# Patient Record
Sex: FEMALE | Race: WHITE | NOT HISPANIC OR LATINO | Employment: OTHER | URBAN - METROPOLITAN AREA
[De-identification: names, ages, dates, MRNs, and addresses within clinical notes are randomized per-mention and may not be internally consistent; named-entity substitution may affect disease eponyms.]

---

## 2017-08-08 ENCOUNTER — HOSPITAL ENCOUNTER (INPATIENT)
Facility: CLINIC | Age: 62
LOS: 2 days | Discharge: HOME | DRG: 810 | End: 2017-08-12
Attending: EMERGENCY MEDICINE | Admitting: INTERNAL MEDICINE
Payer: COMMERCIAL

## 2017-08-08 ENCOUNTER — APPOINTMENT (OUTPATIENT)
Dept: GENERAL RADIOLOGY | Facility: CLINIC | Age: 62
DRG: 810 | End: 2017-08-08
Attending: EMERGENCY MEDICINE
Payer: COMMERCIAL

## 2017-08-08 DIAGNOSIS — R50.9 FEVER, UNSPECIFIED FEVER CAUSE: ICD-10-CM

## 2017-08-08 DIAGNOSIS — R50.9 FEVER: ICD-10-CM

## 2017-08-08 LAB
ALBUMIN SERPL-MCNC: 4 G/DL (ref 3.4–5)
ALBUMIN UR-MCNC: NEGATIVE MG/DL
ALP SERPL-CCNC: 63 U/L (ref 40–150)
ALT SERPL W P-5'-P-CCNC: 28 U/L (ref 0–50)
ANION GAP SERPL CALCULATED.3IONS-SCNC: 8 MMOL/L (ref 3–14)
APPEARANCE UR: CLEAR
AST SERPL W P-5'-P-CCNC: 21 U/L (ref 0–45)
BASOPHILS # BLD AUTO: 0 10E9/L (ref 0–0.2)
BASOPHILS NFR BLD AUTO: 0 %
BILIRUB DIRECT SERPL-MCNC: <0.1 MG/DL (ref 0–0.2)
BILIRUB SERPL-MCNC: 0.2 MG/DL (ref 0.2–1.3)
BILIRUB UR QL STRIP: NEGATIVE
BUN SERPL-MCNC: 11 MG/DL (ref 7–30)
CALCIUM SERPL-MCNC: 8.5 MG/DL (ref 8.5–10.1)
CHLORIDE SERPL-SCNC: 101 MMOL/L (ref 94–109)
CO2 SERPL-SCNC: 28 MMOL/L (ref 20–32)
COLOR UR AUTO: ABNORMAL
CREAT SERPL-MCNC: 0.75 MG/DL (ref 0.52–1.04)
DIFFERENTIAL METHOD BLD: ABNORMAL
EOSINOPHIL # BLD AUTO: 0 10E9/L (ref 0–0.7)
EOSINOPHIL NFR BLD AUTO: 0.6 %
ERYTHROCYTE [DISTWIDTH] IN BLOOD BY AUTOMATED COUNT: 12.6 % (ref 10–15)
GFR SERPL CREATININE-BSD FRML MDRD: 78 ML/MIN/1.7M2
GLUCOSE SERPL-MCNC: 140 MG/DL (ref 70–99)
GLUCOSE UR STRIP-MCNC: NEGATIVE MG/DL
HCT VFR BLD AUTO: 36.5 % (ref 35–47)
HGB BLD-MCNC: 12.4 G/DL (ref 11.7–15.7)
HGB UR QL STRIP: NEGATIVE
IMM GRANULOCYTES # BLD: 0 10E9/L (ref 0–0.4)
IMM GRANULOCYTES NFR BLD: 0 %
KETONES UR STRIP-MCNC: 5 MG/DL
LACTATE BLD-SCNC: 0.8 MMOL/L (ref 0.7–2.1)
LEUKOCYTE ESTERASE UR QL STRIP: NEGATIVE
LYMPHOCYTES # BLD AUTO: 0.5 10E9/L (ref 0.8–5.3)
LYMPHOCYTES NFR BLD AUTO: 32.5 %
MCH RBC QN AUTO: 30.8 PG (ref 26.5–33)
MCHC RBC AUTO-ENTMCNC: 34 G/DL (ref 31.5–36.5)
MCV RBC AUTO: 91 FL (ref 78–100)
MONOCYTES # BLD AUTO: 0.2 10E9/L (ref 0–1.3)
MONOCYTES NFR BLD AUTO: 13.9 %
MUCOUS THREADS #/AREA URNS LPF: PRESENT /LPF
NEUTROPHILS # BLD AUTO: 0.9 10E9/L (ref 1.6–8.3)
NEUTROPHILS NFR BLD AUTO: 53 %
NITRATE UR QL: NEGATIVE
NRBC # BLD AUTO: 0 10*3/UL
NRBC BLD AUTO-RTO: 0 /100
PH UR STRIP: 6.5 PH (ref 5–7)
PLATELET # BLD AUTO: 127 10E9/L (ref 150–450)
POTASSIUM SERPL-SCNC: 3.6 MMOL/L (ref 3.4–5.3)
PROCALCITONIN SERPL-MCNC: NORMAL NG/ML
PROT SERPL-MCNC: 7.1 G/DL (ref 6.8–8.8)
RBC # BLD AUTO: 4.02 10E12/L (ref 3.8–5.2)
RBC #/AREA URNS AUTO: 0 /HPF (ref 0–2)
RETICS # AUTO: 33 10E9/L (ref 25–95)
RETICS/RBC NFR AUTO: 0.8 % (ref 0.5–2)
SODIUM SERPL-SCNC: 137 MMOL/L (ref 133–144)
SP GR UR STRIP: 1 (ref 1–1.03)
URN SPEC COLLECT METH UR: ABNORMAL
UROBILINOGEN UR STRIP-MCNC: NORMAL MG/DL (ref 0–2)
WBC # BLD AUTO: 1.7 10E9/L (ref 4–11)
WBC #/AREA URNS AUTO: <1 /HPF (ref 0–2)

## 2017-08-08 PROCEDURE — 83605 ASSAY OF LACTIC ACID: CPT | Performed by: EMERGENCY MEDICINE

## 2017-08-08 PROCEDURE — 84443 ASSAY THYROID STIM HORMONE: CPT | Performed by: EMERGENCY MEDICINE

## 2017-08-08 PROCEDURE — 80076 HEPATIC FUNCTION PANEL: CPT | Performed by: EMERGENCY MEDICINE

## 2017-08-08 PROCEDURE — 99285 EMERGENCY DEPT VISIT HI MDM: CPT | Mod: Z6 | Performed by: EMERGENCY MEDICINE

## 2017-08-08 PROCEDURE — 87389 HIV-1 AG W/HIV-1&-2 AB AG IA: CPT | Performed by: EMERGENCY MEDICINE

## 2017-08-08 PROCEDURE — 40000611 ZZHCL STATISTIC MORPHOLOGY W/INTERP HEMEPATH TC 85060: Performed by: EMERGENCY MEDICINE

## 2017-08-08 PROCEDURE — 85025 COMPLETE CBC W/AUTO DIFF WBC: CPT | Performed by: EMERGENCY MEDICINE

## 2017-08-08 PROCEDURE — 99220 ZZC INITIAL OBSERVATION CARE,LEVL III: CPT | Mod: AI | Performed by: INTERNAL MEDICINE

## 2017-08-08 PROCEDURE — 99285 EMERGENCY DEPT VISIT HI MDM: CPT | Mod: 25 | Performed by: EMERGENCY MEDICINE

## 2017-08-08 PROCEDURE — 71020 XR CHEST 2 VW: CPT

## 2017-08-08 PROCEDURE — 80048 BASIC METABOLIC PNL TOTAL CA: CPT | Performed by: EMERGENCY MEDICINE

## 2017-08-08 PROCEDURE — 85045 AUTOMATED RETICULOCYTE COUNT: CPT | Performed by: EMERGENCY MEDICINE

## 2017-08-08 PROCEDURE — G0378 HOSPITAL OBSERVATION PER HR: HCPCS

## 2017-08-08 PROCEDURE — 84145 PROCALCITONIN (PCT): CPT | Performed by: EMERGENCY MEDICINE

## 2017-08-08 PROCEDURE — 81001 URINALYSIS AUTO W/SCOPE: CPT | Performed by: EMERGENCY MEDICINE

## 2017-08-08 PROCEDURE — 87040 BLOOD CULTURE FOR BACTERIA: CPT | Performed by: EMERGENCY MEDICINE

## 2017-08-08 PROCEDURE — 25000132 ZZH RX MED GY IP 250 OP 250 PS 637: Performed by: STUDENT IN AN ORGANIZED HEALTH CARE EDUCATION/TRAINING PROGRAM

## 2017-08-08 RX ORDER — VALACYCLOVIR 500 MG/1
250 TABLET ORAL DAILY
Status: DISCONTINUED | OUTPATIENT
Start: 2017-08-09 | End: 2017-08-12 | Stop reason: HOSPADM

## 2017-08-08 RX ORDER — NALOXONE HYDROCHLORIDE 0.4 MG/ML
.1-.4 INJECTION, SOLUTION INTRAMUSCULAR; INTRAVENOUS; SUBCUTANEOUS
Status: DISCONTINUED | OUTPATIENT
Start: 2017-08-08 | End: 2017-08-12 | Stop reason: HOSPADM

## 2017-08-08 RX ORDER — ACETAMINOPHEN 325 MG/1
650 TABLET ORAL EVERY 4 HOURS PRN
Status: DISCONTINUED | OUTPATIENT
Start: 2017-08-08 | End: 2017-08-12 | Stop reason: HOSPADM

## 2017-08-08 RX ADMIN — ACETAMINOPHEN 650 MG: 325 TABLET, FILM COATED ORAL at 23:15

## 2017-08-08 ASSESSMENT — ENCOUNTER SYMPTOMS
DYSURIA: 0
HEADACHES: 1
BACK PAIN: 0
COUGH: 0
DIARRHEA: 0
CHILLS: 1
RHINORRHEA: 0
FEVER: 1
CONSTIPATION: 0

## 2017-08-08 NOTE — IP AVS SNAPSHOT
[address] [address]    Phone:  [phone]                                       After Visit Summary   8/8/2017    Kiera Macedo    MRN: [id number]           After Visit Summary Signature Page     I have received my discharge instructions, and my questions have been answered. I have discussed any challenges I see with this plan with the nurse or doctor.    ..........................................................................................................................................  Patient/Patient Representative Signature      ..........................................................................................................................................  Patient Representative Print Name and Relationship to Patient    ..................................................               ................................................  Date                                            Time    ..........................................................................................................................................  Reviewed by Signature/Title    ...................................................              ..............................................  Date                                                            Time

## 2017-08-08 NOTE — LETTER
Transition Communication Hand-off for Care Transitions to Next Level of Care Provider    Name: Kiera Macedo  MRN #: [id number]  Primary Care Provider: Annette Gabriel     Primary Clinic: [address] [address]     Reason for Hospitalization:  Fever [R50.9]  Admit Date/Time: 8/8/2017  7:59 PM  Discharge Date: 8/12/17  Payor Source: Payor: BLUE PLUS / Plan: BLUE PLUS MNCARE / Product Type: HMO /          Reason for Communication Hand-off Referral: Other Continuity of care    Discharge Plan: Discharge to home with assist as needed       Concern for non-adherence with plan of care:   No    Already enrolled in Tele-monitoring program and name of program:  NA  Follow-up specialty is recommended: Yes    Follow-up plan:  Future Appointments  Date Time Provider Department Center   [facility]   8/24/2017 12:00 PM Magali Sidhu MD [facility]       Danica Alva  RN Care Coordinator  [phone]    AVS/Discharge Summary is the source of truth; this is a helpful guide for improved communication of patient story

## 2017-08-08 NOTE — IP AVS SNAPSHOT
MRN:[id number]                      After Visit Summary   8/8/2017    Kiera Macedo    MRN: [id number]           Thank you!     Thank you for choosing [facility] for your care. Our goal is always to provide you with excellent care. Hearing back from our patients is one way we can continue to improve our services. Please take a few minutes to complete the written survey that you may receive in the mail after you visit with us. Thank you!        Patient Information     Date Of Birth          1955        Designated Caregiver       Most Recent Value    Caregiver    Will someone help with your care after discharge? yes    Name of designated caregiver Elie    Phone number of caregiver [phone]    Caregiver address [address]      About your hospital stay     You were admitted on:  August 8, 2017 You last received care in the:  [address] [facility]    You were discharged on:  August 12, 2017        Reason for your hospital stay       You were hospitalized for a fever of unknown cause and a very low white blood cell count (immune cells). You were seen by hematology who did a bone marrow biopsy. You also received a thyroid ultrasound and a pelvic ultrasound to work up some areas of concern seen on CT scan. It is recommended to follow-up with your Primary Care Physician regarding the results of those test. Also follow-up with Dr. Sidhu in Hematology, you have an appointment on 8/24.                  Who to Call     For medical emergencies, please call 911.  For non-urgent questions about your medical care, please call your primary care provider or clinic, [phone]          Attending Provider     Provider Specialty    Micky Mclean MD Emergency Medicine    Tyrone Johnson MD Internal Medicine    Carlitos Thorpe MD Internal Medicine       Primary Care Provider Office Phone # Fax #    Annette Gabriel [phone]      After Care Instructions     Activity       Your activity upon  discharge: activity as tolerated            Diet       Follow this diet upon discharge: Regular                  Follow-up Appointments     Follow Up and recommended labs and tests       Follow up with primary care provider, Annette Gabriel, within 7 days for hospital follow- up.  The following labs/tests are recommended: Can consider non-emergent MRI to work up lesion on ovary seen in CT and ultrasound.                  Your next 10 appointments already scheduled     Aug 24, 2017 11:30 AM CDT   [facility] Draw with  [facility] LAB DRAW   [facility]    [address] [place] [id number] [phone]            Aug 24, 2017 12:00 PM CDT   (Arrive by 11:45 AM)   Return Visit with Magali Sidhu MD   [facility] ([facility])    [address] [place] [id number] [phone]              Further instructions from your care team       We have scheduled an appt for you on Tuesday 8/22 at 11:15 am with your Primary Care Provider, KENDY Gabriel. Please bring your insurance card, ID, and these discharge papers with you to this appointment.  [place] [address] [facility], [address]  [place] [address]  Phone #: [phone]      Pending Results     Date and Time Order Name Status Description    8/11/2017 0836 Fungal Culture Bone Marrow Preliminary     8/11/2017 0836 Blood Culture AFB Preliminary     8/11/2017 0836 CHROMOSOME BONE MARROW With Professional Interpretation In process     8/11/2017 0836 Leukemia Lymphoma Evaluation (Flow Cytometry) In process     8/11/2017 0836 Bone marrow biopsy In process     8/11/2017 0001 Magdalena Barr Virus Qualitative PCR In process     8/10/2017 1658 Cytomegalovirus Qualitative PCR In process     8/9/2017 0914 Erythrocyte sedimentation rate auto In process     8/9/2017 0914 CRP inflammation In process      "2017 Blood culture Preliminary     2017 Blood culture Preliminary             Statement of Approval     Ordered          17 1131  I have reviewed and agree with all the recommendations and orders detailed in this document.  EFFECTIVE NOW     Approved and electronically signed by:  Wendy Samuels MD             Admission Information     Date & Time Provider Department Dept. Phone    2017 Carlitos Thorpe MD [address] [facility] [phone]      Your Vitals Were     Blood Pressure Pulse Temperature Respirations Weight Pulse Oximetry    99/67 (BP Location: Left arm) 70 96.5  F (35.8  C) (Axillary) 16 62 kg (136 lb 9.6 oz) 97%      MyChart Information     [employer] lets you send messages to your doctor, view your test results, renew your prescriptions, schedule appointments and more. To sign up, go to [email or web address] . Click on \"Log in\" on the left side of the screen, which will take you to the Welcome page. Then click on \"Sign up Now\" on the right side of the page.     You will be asked to enter the access code listed below, as well as some personal information. Please follow the directions to create your username and password.     Your access code is: [email or web address]  Expires: 2017 11:57 AM     Your access code will  in 90 days. If you need help or a new code, please call your [facility] clinic or [phone].        Care EveryWhere ID     This is your Care EveryWhere ID. This could be used by other organizations to access your [facility] medical records  [id number]        Equal Access to Services     [facility]: Hadii zack salomon hadioanao Soandria, waaxda luqadaha, qaybta kaalmada yoandy, shy edwards . So [facility] [phone].    ATENCIÓN: Si habla español, tiene a sidhu disposición servicios gratuitos de asistencia lingüística. Llame al [phone].    We comply with applicable federal civil rights laws and [place] laws. We do not discriminate on the " basis of race, color, national origin, age, disability sex, sexual orientation or gender identity.               Review of your medicines      CONTINUE these medicines which have NOT CHANGED        Dose / Directions    AMITRIPTYLINE HCL PO        Dose:  25 mg   Take 25 mg by mouth At Bedtime   Refills:  0       VALTREX PO        Dose:  250 mg   Take 250 mg by mouth daily   Refills:  0                Protect others around you: Learn how to safely use, store and throw away your medicines at www.disposemymeds.org.             Medication List: This is a list of all your medications and when to take them. Check marks below indicate your daily home schedule. Keep this list as a reference.      Medications           Morning Afternoon Evening Bedtime As Needed    AMITRIPTYLINE HCL PO   Take 25 mg by mouth At Bedtime   Last time this was given:  25 mg on 8/11/2017 10:11 PM                                VALTREX PO   Take 250 mg by mouth daily   Last time this was given:  250 mg on 8/12/2017  8:01 AM

## 2017-08-09 LAB
ANION GAP SERPL CALCULATED.3IONS-SCNC: 8 MMOL/L (ref 3–14)
BASOPHILS # BLD AUTO: 0 10E9/L (ref 0–0.2)
BASOPHILS NFR BLD AUTO: 0 %
BUN SERPL-MCNC: 13 MG/DL (ref 7–30)
CALCIUM SERPL-MCNC: 8.2 MG/DL (ref 8.5–10.1)
CHLORIDE SERPL-SCNC: 103 MMOL/L (ref 94–109)
CO2 SERPL-SCNC: 25 MMOL/L (ref 20–32)
COPATH REPORT: NORMAL
CREAT SERPL-MCNC: 0.78 MG/DL (ref 0.52–1.04)
CRP SERPL-MCNC: <2.9 MG/L (ref 0–8)
DIFFERENTIAL METHOD BLD: ABNORMAL
DIFFERENTIAL METHOD BLD: NORMAL
EOSINOPHIL # BLD AUTO: 0 10E9/L (ref 0–0.7)
EOSINOPHIL NFR BLD AUTO: 0 %
ERYTHROCYTE [DISTWIDTH] IN BLOOD BY AUTOMATED COUNT: 12.7 % (ref 10–15)
ERYTHROCYTE [DISTWIDTH] IN BLOOD BY AUTOMATED COUNT: NORMAL % (ref 10–15)
ERYTHROCYTE [SEDIMENTATION RATE] IN BLOOD BY WESTERGREN METHOD: 6 MM/H (ref 0–30)
ERYTHROCYTE [SEDIMENTATION RATE] IN BLOOD BY WESTERGREN METHOD: NORMAL MM/H (ref 0–30)
GFR SERPL CREATININE-BSD FRML MDRD: 75 ML/MIN/1.7M2
GLUCOSE SERPL-MCNC: 86 MG/DL (ref 70–99)
HCT VFR BLD AUTO: 38 % (ref 35–47)
HCT VFR BLD AUTO: NORMAL % (ref 35–47)
HGB BLD-MCNC: 12.6 G/DL (ref 11.7–15.7)
HGB BLD-MCNC: NORMAL G/DL (ref 11.7–15.7)
HIV 1+2 AB+HIV1 P24 AG SERPL QL IA: NORMAL
IMM GRANULOCYTES # BLD: 0 10E9/L (ref 0–0.4)
IMM GRANULOCYTES NFR BLD: 0 %
LYMPHOCYTES # BLD AUTO: 0.6 10E9/L (ref 0.8–5.3)
LYMPHOCYTES NFR BLD AUTO: 59 %
MCH RBC QN AUTO: 30.5 PG (ref 26.5–33)
MCH RBC QN AUTO: NORMAL PG (ref 26.5–33)
MCHC RBC AUTO-ENTMCNC: 33.2 G/DL (ref 31.5–36.5)
MCHC RBC AUTO-ENTMCNC: NORMAL G/DL (ref 31.5–36.5)
MCV RBC AUTO: 92 FL (ref 78–100)
MCV RBC AUTO: NORMAL FL (ref 78–100)
MONOCYTES # BLD AUTO: 0.1 10E9/L (ref 0–1.3)
MONOCYTES NFR BLD AUTO: 11.4 %
NEUTROPHILS # BLD AUTO: 0.3 10E9/L (ref 1.6–8.3)
NEUTROPHILS NFR BLD AUTO: 29.6 %
NRBC # BLD AUTO: 0 10*3/UL
NRBC BLD AUTO-RTO: 0 /100
PLATELET # BLD AUTO: 108 10E9/L (ref 150–450)
PLATELET # BLD AUTO: NORMAL 10E9/L (ref 150–450)
PLATELET # BLD EST: ABNORMAL 10*3/UL
POTASSIUM SERPL-SCNC: 4 MMOL/L (ref 3.4–5.3)
RBC # BLD AUTO: 4.13 10E12/L (ref 3.8–5.2)
RBC # BLD AUTO: NORMAL 10E12/L (ref 3.8–5.2)
SODIUM SERPL-SCNC: 136 MMOL/L (ref 133–144)
TSH SERPL DL<=0.005 MIU/L-ACNC: 0.8 MU/L (ref 0.4–4)
WBC # BLD AUTO: 1.1 10E9/L (ref 4–11)
WBC # BLD AUTO: NORMAL 10E9/L (ref 4–11)

## 2017-08-09 PROCEDURE — 36415 COLL VENOUS BLD VENIPUNCTURE: CPT | Performed by: INTERNAL MEDICINE

## 2017-08-09 PROCEDURE — 85652 RBC SED RATE AUTOMATED: CPT | Performed by: INTERNAL MEDICINE

## 2017-08-09 PROCEDURE — 86140 C-REACTIVE PROTEIN: CPT | Performed by: INTERNAL MEDICINE

## 2017-08-09 PROCEDURE — 99233 SBSQ HOSP IP/OBS HIGH 50: CPT | Mod: GC | Performed by: INTERNAL MEDICINE

## 2017-08-09 PROCEDURE — 80048 BASIC METABOLIC PNL TOTAL CA: CPT | Performed by: STUDENT IN AN ORGANIZED HEALTH CARE EDUCATION/TRAINING PROGRAM

## 2017-08-09 PROCEDURE — 25000132 ZZH RX MED GY IP 250 OP 250 PS 637: Performed by: STUDENT IN AN ORGANIZED HEALTH CARE EDUCATION/TRAINING PROGRAM

## 2017-08-09 PROCEDURE — G0378 HOSPITAL OBSERVATION PER HR: HCPCS

## 2017-08-09 PROCEDURE — 85025 COMPLETE CBC W/AUTO DIFF WBC: CPT | Performed by: INTERNAL MEDICINE

## 2017-08-09 PROCEDURE — 80048 BASIC METABOLIC PNL TOTAL CA: CPT | Performed by: INTERNAL MEDICINE

## 2017-08-09 RX ORDER — ROPINIROLE 0.25 MG/1
0.25 TABLET, FILM COATED ORAL DAILY
Status: DISCONTINUED | OUTPATIENT
Start: 2017-08-09 | End: 2017-08-12 | Stop reason: HOSPADM

## 2017-08-09 RX ADMIN — AMITRIPTYLINE HYDROCHLORIDE 25 MG: 25 TABLET, FILM COATED ORAL at 22:42

## 2017-08-09 RX ADMIN — ROPINIROLE HYDROCHLORIDE 0.25 MG: 0.25 TABLET, FILM COATED ORAL at 15:07

## 2017-08-09 RX ADMIN — ACETAMINOPHEN 650 MG: 325 TABLET, FILM COATED ORAL at 15:07

## 2017-08-09 RX ADMIN — ACETAMINOPHEN 650 MG: 325 TABLET, FILM COATED ORAL at 05:08

## 2017-08-09 RX ADMIN — Medication 250 MG: at 07:51

## 2017-08-09 RX ADMIN — AMITRIPTYLINE HYDROCHLORIDE 25 MG: 25 TABLET, FILM COATED ORAL at 05:07

## 2017-08-09 NOTE — PLAN OF CARE
Problem: Discharge Planning  Goal: Discharge Planning (Adult, OB, Behavioral, Peds)  Outpatient/Observation goals to be met before discharge home:     1.  Diagnostic tests and consults completed and resulted:  NO, AM labs scheduled   2.  Vital signs normal or at pt baseline:  YES  Nurse to notify provider when observation goals have been met and pt is ready for discharge.

## 2017-08-09 NOTE — PLAN OF CARE
Problem: Discharge Planning  Goal: Discharge Planning (Adult, OB, Behavioral, Peds)  Outpatient/Observation goals to be met before discharge home:     1.  Diagnostic tests and consults completed and resulted:  NO, AM and evening labs scheduled. Absolute neutrophil result 0.3, down from 0.9.   2.  Vital signs normal or at pt baseline:  YES  Nurse to notify provider when observation goals have been met and pt is ready for discharge.

## 2017-08-09 NOTE — ED NOTES
Pt comes in from urgent care for f/u on low white count and 2 days of fevers and generalized aches. Pt says this happens intermittently, but symptoms usually resolve. She has never had a low wbc in the past with symptoms. Hx tick borne illness last year, has been tested multiple times this summer for recurrence of tick borne illness but has never been positive. Alert and oriented. Temp 99.6 oral, given 450mg tylenol about 1.5 hours ago at urgent care. Pt says at urgent care her temp was over 102.

## 2017-08-09 NOTE — PROGRESS NOTES
[facility] Internal Medicine Progress Note          Assessment and Plan:   Assessment:   Kiera Macedo is a 62 yo F with PMH of 1 level spinal fusion who presented to [facility] ED from [place] on 8/8 for intermittent fevers with chills from May until Mid-June and was found to be neutropenic and leukopenic (, ) upon visit to urgent care.       Plan:   #Fever of unknown origin  #Leukopenia  #Absolute Neutropenia  #Thrombocytopenia  Tick-borne illness vs bone marrow infiltrative vs viral illness. Viral illness is most likely, but has no sick contacts or other localizing symptoms. Possibly tick-borne due to pt's history of multiple exposures and outdoor behavior- [place], states her dog got lyme disease from a tick in the park. Has not had any known tick exposures or rashes. 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 Unlikely bone marrow due to normal Hgb without night sweats or bone pain. Admitted with , , WBC 1.7K, . (8/9) labs: WBC 1.1K, , , . HIV negative, TSH and LFTs normal. Lactic acid normal. ESR normal.   - Recheck CBC this evening and tomorrow AM   - Neutropenic precautions    - If febrile overnight, consider ID consult in AM   - Can also consider Heme/Onc consult for severe neutropenia   - HIV pending   - Blood smear pending   - Blood cultures pending   - Tickborne serologies pending for outside urgent care - Babesia and Anaplasma      - Negative for Lyme serologies.     #Joint Pains  Likely related to causation of fevers due to timing following with febrile episodes. Denies muscle pains or current joint pains. Negative for edema or rashes   - Tylenol PRN        Diet: Regular Diet Adult  Fluids: PO, no IVF indicated  DVT Prophylaxis: Ambulate every shift  Code Status: Full Code    Patient discussed with Dr. Thorpe, who agrees with the assessment and plan.     Wendy Samuels  MD  Internal Medicine, PGY 1  [phone]        Interval History:   Patient sleeping comfortably. Denying pain. States this feels very similar to when she had anaplasmosis in the past, in 2016 - she was treated with doxycycline. Had been afebrile since mid June following 2-3 weeks of intermittent fevers. Denies any sick contacts or any other localizing symptoms - no cough, SOB, headache, chest pain, rashes, trauma, ab pain, urinary symptoms, neck stiffness.            Significant Problems:     Past Medical History:   Diagnosis Date     Herpes              Review of Systems:   A comprehensive review of systems was performed and found to be negative except as described in this note           Medications:     Prescriptions Prior to Admission   Medication Sig Dispense Refill Last Dose     AMITRIPTYLINE HCL PO Take 25 mg by mouth At Bedtime   8/7/2017 at Unknown time     ValACYclovir HCl (VALTREX PO) Take 250 mg by mouth daily   8/8/2017 at Unknown time        Physical Exam:  /71 (BP Location: Right arm)  Pulse 77  Temp 99.3  F (37.4  C) (Oral)  Resp 16  SpO2 95%  GEN: NAD, sleeping, non-toxic  HEENT: EOMI, no scleral icterus  CV: RRR, no M/R/G appreciated, normal S1/S2  PULM: CTAB, good effort, no increased work of breathing  ABD: Normal BS, non-tender, non-distended  EXT: No edema, ulcerations   NEURO: EOMI, appropriate mentation, no aphasia          Data:     Lab Results   Component Value Date    WBC 1.1 (L) 08/09/2017    WBC  08/09/2017     Unsatisfactory specimen - clotted  Notified Lynda Tvedt 6D, 1030 8/9/17 by       WBC 1.7 (L) 08/08/2017    HGB 12.6 08/09/2017    HGB  08/09/2017     Unsatisfactory specimen - clotted  Notified Lynda Tvedt 6D, 1030 8/9/17 by       HGB 12.4 08/08/2017    HCT 38.0 08/09/2017    HCT  08/09/2017     Unsatisfactory specimen - clotted  Notified Lynda Tvedt 6D, 1030 8/9/17 by       HCT 36.5 08/08/2017     (L) 08/09/2017    PLT  08/09/2017     Unsatisfactory  specimen - clotted  Notified Lynda Tvedt 6D, 1030 8/9/17 by        (L) 08/08/2017     08/09/2017     08/08/2017    POTASSIUM 4.0 08/09/2017    POTASSIUM 3.6 08/08/2017    CHLORIDE 103 08/09/2017    CHLORIDE 101 08/08/2017    CO2 25 08/09/2017    CO2 28 08/08/2017    BUN 13 08/09/2017    BUN 11 08/08/2017    CR 0.78 08/09/2017    CR 0.75 08/08/2017    GLC 86 08/09/2017     (H) 08/08/2017    SED 6 08/09/2017    SED  08/09/2017     Unsatisfactory specimen - clotted  Notified Lynda Tvedt 6D, 1030 8/9/17 by       AST 21 08/08/2017    ALT 28 08/08/2017    ALKPHOS 63 08/08/2017    BILITOTAL 0.2 08/08/2017

## 2017-08-09 NOTE — ED PROVIDER NOTES
History     Chief Complaint   Patient presents with     Fever     Abnormal Labs     HPI  Kiera Macedo is a 61 year old female with a history of neck fusion surgery (1 pin in neck) who presents to the ED with fever and abnormal labs. Patient states she has had intermittent fevers and generalized body aches for the past couple of months. She states she been going in to  several times for this with no diagnosis. She was seen in an  clinic today and had a low WBC.  She reports she started feeling unwell 1-2 days ago and has complaints of fever, generalized body aches, a bit of a left-sided headache, and chills. She states she had her last physical 6 months ago and recently switched from [facility] to [facility] in the last couple of months. She states her current symptoms are similar to when she had a tick bourne disease last summer and was treated with doxycyline which improved her symptoms. She also reports she had low platelets at that time.  She otherwise denies cough, rhinorrhea, constipation, diarrhea, dysuria, rash, recent travel,or back pain.     PAST MEDICAL HISTORY  Past Medical History:   Diagnosis Date     Herpes      PAST SURGICAL HISTORY  Past Surgical History:   Procedure Laterality Date      SECTION       FUSION CERVICAL ANTERIOR ONE LEVEL       SINUS SURGERY       FAMILY HISTORY  Family History   Problem Relation Age of Onset     HEART DISEASE Father      SOCIAL HISTORY  Social History   Substance Use Topics     Smoking status: Never Smoker     Smokeless tobacco: Never Used     Alcohol use Yes      Comment: 3-4 glasses per week     MEDICATIONS  Current Facility-Administered Medications   Medication     amitriptyline (ELAVIL) tablet 25 mg     valACYclovir (VALTREX) half-tab 250 mg     naloxone (NARCAN) injection 0.1-0.4 mg     acetaminophen (TYLENOL) tablet 650 mg     ALLERGIES  Allergies   Allergen Reactions     Ampicillin Diarrhea       I have reviewed the Medications, Allergies, Past  Medical and Surgical History, and Social History in the Epic system.    Review of Systems   Constitutional: Positive for chills and fever.   HENT: Negative for rhinorrhea.    Respiratory: Negative for cough.    Gastrointestinal: Negative for constipation and diarrhea.   Genitourinary: Negative for dysuria. Difficulty urinating: left sided.   Musculoskeletal: Negative for back pain.   Skin: Negative for rash.   Neurological: Positive for headaches.   All other systems reviewed and are negative.      Physical Exam   BP: 110/74  Heart Rate: 88  Temp: 99.6  F (37.6  C)  Resp: 16  SpO2: 98 %  Physical Exam   Constitutional: She is oriented to person, place, and time. She appears well-developed and well-nourished. No distress.   HENT:   Head: Normocephalic and atraumatic.   Mouth/Throat: Oropharynx is clear and moist. No oropharyngeal exudate.   Eyes: Pupils are equal, round, and reactive to light. No scleral icterus.   Neck: Normal range of motion. Neck supple.   Cardiovascular: Normal rate, normal heart sounds and intact distal pulses.    Pulmonary/Chest: Effort normal and breath sounds normal. No respiratory distress.   Abdominal: Soft. Bowel sounds are normal. There is no tenderness.   Musculoskeletal: She exhibits no edema or tenderness.   Neurological: She is alert and oriented to person, place, and time.   Skin: Skin is warm and dry. No rash noted. She is not diaphoretic. No erythema. No pallor.       ED Course     ED Course     Procedures   8:26 PM  The patient was seen and examined by Dr. Mclean in Room 14.                Critical Care time:  none               Labs Ordered and Resulted from Time of ED Arrival Up to the Time of Departure from the ED   BASIC METABOLIC PANEL - Abnormal; Notable for the following:        Result Value    Glucose 140 (*)     All other components within normal limits   CBC WITH PLATELETS DIFFERENTIAL - Abnormal; Notable for the following:     WBC 1.7 (*)     Platelet Count 127 (*)      Absolute Neutrophil 0.9 (*)     Absolute Lymphocytes 0.5 (*)     All other components within normal limits   ROUTINE UA WITH MICROSCOPIC REFLEX TO CULTURE - Abnormal; Notable for the following:     Ketones Urine 5 (*)     Mucous Urine Present (*)     All other components within normal limits   LACTIC ACID WHOLE BLOOD   BLOOD MORPHOLOGY PATHOLOGIST REVIEW   RETICULOCYTE COUNT   HEPATIC PANEL            Assessments & Plan (with Medical Decision Making)   This is a 61-year-old female patient who is otherwise healthy with no significant past medical history is presenting to the emergency room with intermittent fevers.  Her physical exam is otherwise unremarkable.  She does not have a fever here as she took Tylenol just prior to coming into the emergency room.  Her blood pressure is otherwise unremarkable.  She was at an urgent care prior to coming into the emergency room and found to have a fever 102 Fahrenheit. Their greatest concern is the fact that she had an absolute Neutropenia.  Her White Count Is Only 1.8.  Is Unclear As to the Etiology of Her Neutropenia.  She Has Otherwise Been a Healthy Person.  At This Time She Will Be Admitted to the Medicine Service for Continued Care Management.  Were Unable to Find the Source of Any Infection at This Time.  We Did Discuss Any Acute Interventions Required Here in the Emergency Room and Have Determined That We Will Hold off on Antibiotics at This Time.    I have reviewed the nursing notes.    I have reviewed the findings, diagnosis, plan and need for follow up with the patient.    Current Discharge Medication List          Final diagnoses:   Fever     David SALAZAR, am serving as a trained medical scribe to document services personally performed by Micky Mclean MD, based on the provider's statements to me.      Micky SALAZAR MD, was physically present and have reviewed and verified the accuracy of this note documented by David Venegas.     8/8/2017   [facility],  EMERGENCY DEPARTMENT     Micky Mclean MD  08/09/17 0121

## 2017-08-09 NOTE — PROGRESS NOTES
Pt admitted to unit 6D from ED.  Temp 99.1.  Reports intermittent fevers and generalized weakness off and on for past several months.  Admission profile complete.  Oriented to call light and unit procedures.  Plan is for blood morphology to be sent in AM and await results.  Pt has PIV SL.  Up independently.  Tylenol given for headache pain.  Cont with POC.

## 2017-08-09 NOTE — H&P
[facility], [place]    Internal Medicine History and Physical - [facility] Service       Date of Admission:  8/8/2017    Chief Complaint   fever    History is obtained from the patient    History of Present Illness   Kiera Macedo is a 61 year old female with past medical history of 1 level spinal fusion who presented to the ED from  on 8/8 admitted for intermittent fevers and leukopenia. Notes the fevers started in May when she had a fever for about 1.5 days with joint aches which both resolved and then recurred about every 1-2 weeks until mid-June when they stopped. The fevers coincide with aching joints mostly in her hips, shoulders and ankles. Denies any other associated symptoms: weight loss, loss of appetite, mouth sores, shortness of breath, cough, sore throat, chest pain, nausea, vomiting, diarrhea, constipation, confusion, fatigue, vision changes, edema, numbness, tingling, lightheadedness, headache or urinary symptoms. She presented to urgent care a few times during the 6 weeks with labs (CBC, stool cultures, blood cultures, UA, SAMIR, sed rate, ESR, CCP ab, babesia, CMP, lyme, anaplasma) all returning unremarkable and CXR negative for acute process.    Between June and now patient notes she felt completely well. The fever started again today with chills this afternoon - temp was 99 at home and she checked it every few hours and continued to increase by 0.2 degrees so she went to urgent care. Today she endorses mild headache, sinus congestion and some bruising on her forearms. At urgent care today, Will repeat tick-borne illness panel at this point, discussed sometimes early in course the tests can be falsely negative so repeating them is indicated. WBC returned with new neutropenia of 1000 and lymphopenia of 600 and in the setting of fever to 102 recommended patient go to ER.    She states her current symptoms are similar to when she had a tick bourne disease (anaplasma likely -  lyme and babesia negative at the time) last summer and was treated with doxycyline which improved her symptoms. She also reports she had low platelets at that time and did receive a transfusion. Travels monthly. Within the last year to [place], [place], [place] ([place]) and [place]. No issues with illness during or after the trips. She has two dogs at home, avid  and currently works three jobs:  B&B owner, realtor and online . Only takes medications listed below and multivitamin, calcium, coQ10 and tart cherry juice.    In the ER today, HR, RR and BP were all within normal limits with a temperature of 99.6F. Labs ordered included blood culture, UA, CMP, CBC, lactate, reticulocyte count, and procal. CXR without any acute findings. UA, procal, lactic acid, CMP were normal. CBC with , , platelets 127 which all were completely normal 17.     Review of Systems   The 10 point Review of Systems is negative other than noted in the HPI or here.     Past Medical History    I have reviewed this patient's medical history and updated it with pertinent information if needed.   Past Medical History:   Diagnosis Date     Herpes         Past Surgical History   I have reviewed this patient's surgical history and updated it with pertinent information if needed.  Past Surgical History:   Procedure Laterality Date      SECTION       FUSION CERVICAL ANTERIOR ONE LEVEL       SINUS SURGERY          Social History   Social History   Substance Use Topics     Smoking status: Never Smoker     Smokeless tobacco: Never Used     Alcohol use Yes      Comment: 3-4 glasses per week   Currently works three jobs: B&B owner, realtor and online   Has a boyfriend and 2 dogs.  Travels monthly. Within the last year to [place], [place], [place] ([place] and [place].    Family History   I have reviewed this patient's family history and updated it with pertinent information if  needed.   Family History   Problem Relation Age of Onset     HEART DISEASE Father    No family history of rheumatoid arthritis, has a niece with lupus.  No significant cancer history.    Prior to Admission Medications   Prior to Admission Medications   Prescriptions Last Dose Informant Patient Reported? Taking?   AMITRIPTYLINE HCL PO 8/7/2017 at Unknown time  Yes Yes   Sig: Take 25 mg by mouth At Bedtime   ValACYclovir HCl (VALTREX PO) 8/8/2017 at Unknown time  Yes Yes   Sig: Take 250 mg by mouth daily      Facility-Administered Medications: None     Allergies   Allergies   Allergen Reactions     Ampicillin Diarrhea       Physical Exam   Vital Signs: Temp: 99.1  F (37.3  C) Temp src: Oral BP: 126/82 Pulse: 77 Heart Rate: 88 Resp: 16 SpO2: 96 % O2 Device: None (Room air)    Weight: 0 lbs 0 oz    General: In no acute distress, resting comfortably in bed, excellent historian  HEENT: PERRL, EOMI, throat clear, mmm, no oral ulcers  Lymph: no cervical adenopathy   Cardiac: regular rate and rhythm, normal s1s2, no murmur or rub, capillary refill < 2 sec, peripheral pulses intact  Pulmonary: CTAB easy respiratory effort on room air  Abdomen: soft, non-tender, non-distended, bowel sounds present  Musculoskeletal: no peripheral or joint edema or erythema, no CVA tenderness   Skin: no erythema, rash, jaundice, or pallor  Neuro: alert and oriented x3, no focal deficit, CN 2-12 grossly intact    Labs and imaging reviewed in [facility].    Assessment & Plan   Kiera Macedo is a 61 year old female with past medical history of 1 level spinal fusion who presented to the ED from  on 8/8 admitted for intermittent fevers with chills every few weeks from May until Mid- June now returning and new leukopenia ( and ) found on visit to urgent care.     # Fever  Meets criteria for fever of unknown origin - fevers greater than 100.9, present for greater than 3 weeks and no diagnosis so far. No clear daily cyclical pattern or  associated symptoms other than joint aches. Broad differential includes infection vs malignancy vs connective tissue disease vs others. Recently this is more concerning in the setting of new neutropenia and lymphopenia for HIV vs malignancy vs unknown infection.   - monitor vitals and signs and symptoms guide further testing  - in addition to follow in up labs from ER ordered HIV, peripheral smear, and TSH  - follow up tick jimmy illness testing completed in urgent care    # Neutropenia  On admission, absolute neurophil count of 900. Does not meet criteria for neutropenia fever. Some concern for infiltrative process of the bone marrow however patient is not pancytopenic. Likely not drug induced as patient is only on acyclovir and amitriptyline which are long standing medications. She does not have history of autoimmune disease. Could be low in the setting of unknown infection.  - labs as above  - monitor CBC  - consider hematology consult if does not improve    # Lymphopenia  On admission, absolute lymphocyte count of 500. Some concern for infiltrative process of the bone marrow however patient is not pancytopenic. Recent HIV status unknown. Does not appear malnourished.  - HIV and labs as above  - monitor CBC  - consider hematology consult if does not improve    # Thrombocytopenia  Most recently was 148 on 8/8 at urgent care and decreased slightly to 127 on admission. Some concern for infiltrative process of the bone marrow however patient is not pancytopenic.   - recheck tomorrow morning  - consider hematology consult if does not improve    # Joint Aches  Coincide with fevers. Joints without decreased ROM, edema or erythema.  - tylenol prn    Diet: Regular Diet Adult  Fluids: PO  DVT Prophylaxis: Ambulate every shift  Code Status: Full Code    Disposition Plan   Expected discharge: Tomorrow; recommended to prior living arrangement once further evaluation of fever is complete.     Entered: Augustina Carlson  08/09/2017, 1:31 AM   Information in the above section will display in the discharge planner report.    The patient was discussed with Dr. Tyrone Carlson   Med+Peds PGY1  Med[facility]   Pager: [phone]  Please see sticky note for cross cover information      Data   Data     Recent Labs  Lab 08/08/17 2024   WBC 1.7*   HGB 12.4   MCV 91   *      POTASSIUM 3.6   CHLORIDE 101   CO2 28   BUN 11   CR 0.75   ANIONGAP 8   BC 8.5   *   ALBUMIN 4.0   PROTTOTAL 7.1   BILITOTAL 0.2   ALKPHOS 63   ALT 28   AST 21     Recent Results (from the past 24 hour(s))   Chest XR,  PA & LAT    Narrative    XR CHEST 2 VW, 8/8/2017 8:43 PM.    Comparison: None.    History: fever.    Findings:   Cardiomediastinal silhouette and pulmonary vasculature within normal  limits. Minimal left basilar atelectasis, no acute airspace opacity.  No pleural effusion or pneumothorax. No soft tissue abnormalities. No  concerning bony lesions. Fusion hardware of the cervical spine. Nipple  shadow of the right lung base, less conspicuous on the left.      Impression    Impression:   No acute findings in the chest.    I have personally reviewed the examination and initial interpretation  and I agree with the findings.    BOB JIMENEZ MD       Internal Medicine Staff Addendum  Date of Service: 8/8/2017  I have seen and examined Ms. Macedo, reviewed the data and discussed the plan of care with the patient and the care team on P&FC Rounds.  I agree with the above documentation     I discussed pt's care with bedside RN today.  I personally reviewed labs, medications and past 24 hr notes.  Assessment/Plan/Diagnoses: plan/dx as above, which contains my edits and reflects our joint medical decision-making.     Tyrone Johnson MD  Internal Medicine/Pediatrics Hospitalist & Staff Physician   of Internal Medicine and Pediatrics  [facility]  Pager:  [phone]

## 2017-08-10 ENCOUNTER — APPOINTMENT (OUTPATIENT)
Dept: CT IMAGING | Facility: CLINIC | Age: 62
DRG: 810 | End: 2017-08-10
Payer: COMMERCIAL

## 2017-08-10 PROBLEM — D70.9 NEUTROPENIA (H): Status: ACTIVE | Noted: 2017-08-10

## 2017-08-10 LAB
BASOPHILS # BLD AUTO: 0 10E9/L (ref 0–0.2)
BASOPHILS NFR BLD AUTO: 0 %
DIFFERENTIAL METHOD BLD: ABNORMAL
EOSINOPHIL # BLD AUTO: 0 10E9/L (ref 0–0.7)
EOSINOPHIL NFR BLD AUTO: 0 %
ERYTHROCYTE [DISTWIDTH] IN BLOOD BY AUTOMATED COUNT: 12.7 % (ref 10–15)
HCT VFR BLD AUTO: 38.7 % (ref 35–47)
HGB BLD-MCNC: 13 G/DL (ref 11.7–15.7)
IMM GRANULOCYTES # BLD: 0 10E9/L (ref 0–0.4)
IMM GRANULOCYTES NFR BLD: 0 %
LYMPHOCYTES # BLD AUTO: 0.9 10E9/L (ref 0.8–5.3)
LYMPHOCYTES NFR BLD AUTO: 64.1 %
MCH RBC QN AUTO: 30.8 PG (ref 26.5–33)
MCHC RBC AUTO-ENTMCNC: 33.6 G/DL (ref 31.5–36.5)
MCV RBC AUTO: 92 FL (ref 78–100)
MONOCYTES # BLD AUTO: 0.2 10E9/L (ref 0–1.3)
MONOCYTES NFR BLD AUTO: 12 %
NEUTROPHILS # BLD AUTO: 0.3 10E9/L (ref 1.6–8.3)
NEUTROPHILS NFR BLD AUTO: 23.9 %
NRBC # BLD AUTO: 0 10*3/UL
NRBC BLD AUTO-RTO: 0 /100
PLATELET # BLD AUTO: 97 10E9/L (ref 150–450)
PLATELET # BLD EST: ABNORMAL 10*3/UL
RADIOLOGIST FLAGS: NORMAL
RBC # BLD AUTO: 4.22 10E12/L (ref 3.8–5.2)
WBC # BLD AUTO: 1.4 10E9/L (ref 4–11)

## 2017-08-10 PROCEDURE — 25000132 ZZH RX MED GY IP 250 OP 250 PS 637: Performed by: STUDENT IN AN ORGANIZED HEALTH CARE EDUCATION/TRAINING PROGRAM

## 2017-08-10 PROCEDURE — 25000128 H RX IP 250 OP 636: Performed by: INTERNAL MEDICINE

## 2017-08-10 PROCEDURE — 85025 COMPLETE CBC W/AUTO DIFF WBC: CPT | Performed by: STUDENT IN AN ORGANIZED HEALTH CARE EDUCATION/TRAINING PROGRAM

## 2017-08-10 PROCEDURE — 74177 CT ABD & PELVIS W/CONTRAST: CPT

## 2017-08-10 PROCEDURE — G0378 HOSPITAL OBSERVATION PER HR: HCPCS

## 2017-08-10 PROCEDURE — 12000001 ZZH R&B MED SURG/OB UMMC

## 2017-08-10 PROCEDURE — 99222 1ST HOSP IP/OBS MODERATE 55: CPT | Mod: GC | Performed by: INTERNAL MEDICINE

## 2017-08-10 PROCEDURE — 36415 COLL VENOUS BLD VENIPUNCTURE: CPT | Performed by: STUDENT IN AN ORGANIZED HEALTH CARE EDUCATION/TRAINING PROGRAM

## 2017-08-10 PROCEDURE — 87496 CYTOMEG DNA AMP PROBE: CPT | Performed by: STUDENT IN AN ORGANIZED HEALTH CARE EDUCATION/TRAINING PROGRAM

## 2017-08-10 PROCEDURE — 99233 SBSQ HOSP IP/OBS HIGH 50: CPT | Mod: GC | Performed by: INTERNAL MEDICINE

## 2017-08-10 PROCEDURE — 71260 CT THORAX DX C+: CPT

## 2017-08-10 RX ORDER — IOPAMIDOL 755 MG/ML
84 INJECTION, SOLUTION INTRAVASCULAR ONCE
Status: COMPLETED | OUTPATIENT
Start: 2017-08-10 | End: 2017-08-10

## 2017-08-10 RX ADMIN — Medication 250 MG: at 08:33

## 2017-08-10 RX ADMIN — ROPINIROLE HYDROCHLORIDE 0.25 MG: 0.25 TABLET, FILM COATED ORAL at 08:33

## 2017-08-10 RX ADMIN — AMITRIPTYLINE HYDROCHLORIDE 25 MG: 25 TABLET, FILM COATED ORAL at 23:09

## 2017-08-10 RX ADMIN — IOPAMIDOL 84 ML: 755 INJECTION, SOLUTION INTRAVENOUS at 18:17

## 2017-08-10 NOTE — PLAN OF CARE
Problem: Discharge Planning  Goal: Discharge Planning (Adult, OB, Behavioral, Peds)  Comments: -diagnostic tests and consults completed and resulted. No. Pending CBC this am  -vital signs normal or at patient baseline. No. Last temp 99.5

## 2017-08-10 NOTE — PLAN OF CARE
Problem: Discharge Planning  Goal: Discharge Planning (Adult, OB, Behavioral, Peds)  Comments: -diagnostic tests and consults completed and resulted. No. Pending CBC this am  -vital signs normal or at patient baseline. No  /79 (BP Location: Right arm)  Pulse 84  Temp 100.2  F (37.9  C) (Oral)  Resp 16  SpO2 96%

## 2017-08-10 NOTE — PLAN OF CARE
Problem: Discharge Planning  Goal: Discharge Planning (Adult, OB, Behavioral, Peds)  Outpatient/Observation goals to be met before discharge home:     1.  Diagnostic tests and consults completed and resulted:  NO, am labs pending.   2.  Vital signs normal or at pt baseline:  YES  Nurse to notify provider when observation goals have been met and pt is ready for discharge.

## 2017-08-10 NOTE — DISCHARGE INSTRUCTIONS
We have scheduled an appt for you on Tuesday 8/22 at 11:15 am with your Primary Care Provider, KENDY Gabriel. Please bring your insurance card, ID, and these discharge papers with you to this appointment.  [id number] [facility], [address]  [place] [address]  Phone #: [phone]

## 2017-08-10 NOTE — PLAN OF CARE
Problem: Discharge Planning  Goal: Discharge Planning (Adult, OB, Behavioral, Peds)  Outpatient/Observation goals to be met before discharge home:     1.  Diagnostic tests and consults completed and resulted:  NO,  Heme/Onc consult order. Absolute neutrophil result 0.3, again this am.   2.  Vital signs normal or at pt baseline:  YES  Patient to be admitted to inpatient per MD.

## 2017-08-10 NOTE — PROGRESS NOTES
/79 (BP Location: Right arm)  Pulse 84  Temp 98.7  F (37.1  C) (Oral)  Resp 16  Wt 62 kg (136 lb 9.6 oz)  SpO2 97%    Patient report no pain this shift, Patient reports feeling more alert today than yesterday. Patient is tolerating PO intake and voiding without difficulty. Patient absolute neutrophil results this am was 0.3, no change from  8/9 results. CT done, results pending. Patient transferred for OBS status to inpatient. Report given to 5A RN. Patient will transfer when room os available.

## 2017-08-10 NOTE — PLAN OF CARE
Problem: Discharge Planning  Goal: Discharge Planning (Adult, OB, Behavioral, Peds)  Comments: -diagnostic tests and consults completed and resulted. No. Pending.  -vital signs normal or at patient baseline. No. Last temp 99.5

## 2017-08-10 NOTE — PROGRESS NOTES
[facility] Internal Medicine Progress Note          Assessment and Plan:   Assessment:   Kiera Macedo is a 60 yo F with PMH of 1 level spinal fusion who presented to [facility] ED from [place] on 8/8 for intermittent fevers with chills from May until Mid-June and was found to be neutropenic and leukopenic (, ) upon visit to urgent care.       Plan:   #Fever of unknown origin  #Lymphopenia  #Absolute Neutropenia  #Thrombocytopenia  Tick-borne illness vs bone marrow infiltrative vs viral illness. Viral illness is most likely, but has no sick contacts or other localizing symptoms. Possibly tick-borne due to pt's history of multiple exposures and outdoor behavior- [place] [facility], states her dog got lyme disease from a tick in the park. Has not had any known tick exposures or rashes. 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 Unlikely bone marrow due to normal Hgb without night sweats or bone pain. Admitted with , , WBC 1.7K, . (8/9) labs: WBC 1.1K, , , .(8/10) labs: WBC 1.4K, , . HIV negative, TSH and LFTs normal. Lactic acid normal. ESR normal. Anaplasma negative per [facility].   - Recheck CBC in AM   - Neutropenic precautions    - If febrile overnight, consider ID consult in AM   - Heme/onc consulted, appreciate recs       - Viral work up: EBV, CMV, Hep B surface ab, Hep B Surface ag, Hep C, INR, PTT       - CT Chest/Ab/Pelvis w/ contrast       - Plan for bone marrow biopsy   - NPO at 5 am for biopsy   - Blood smear pending   - Blood cultures pending   - Tickborne serologies pending for outside urgent care - Babesia pending      - Negative for Lyme serologies.     #Joint Pains  Likely related to causation of fevers due to timing following with febrile episodes. Denies muscle pains or current joint pains. Negative for edema or rashes   - Tylenol PRN        Diet: Regular  Diet Adult  Fluids: PO, no IVF indicated  DVT Prophylaxis: Ambulate every shift  Code Status: Full Code    Patient discussed with Dr. Thorpe, who agrees with the assessment and plan.     Wendy Samuels MD  Internal Medicine, PGY 1  [phone]        Interval History:   Patient sleeping comfortably. Denying pain. Denies fevers, chills, sweats. Feeling well. Agreeable to heme/onc plan to do bone marrow biopsy, per heme/onc.           Significant Problems:     Past Medical History:   Diagnosis Date     Herpes              Review of Systems:   A comprehensive review of systems was performed and found to be negative except as described in this note           Medications:     Prescriptions Prior to Admission   Medication Sig Dispense Refill Last Dose     AMITRIPTYLINE HCL PO Take 25 mg by mouth At Bedtime   8/7/2017 at Unknown time     ValACYclovir HCl (VALTREX PO) Take 250 mg by mouth daily   8/8/2017 at Unknown time        Physical Exam:  /79 (BP Location: Right arm)  Pulse 84  Temp 98.7  F (37.1  C) (Oral)  Resp 16  SpO2 97%  GEN: NAD, sitting comfortably in bed, pleasant, non-toxic  HEENT: EOMI, no scleral icterus  CV: RRR, no M/R/G appreciated, normal S1/S2  PULM: CTAB, good effort, no increased work of breathing  ABD: Normal BS, non-tender, non-distended  EXT: No edema, ulcerations   NEURO: EOMI, appropriate mentation, no aphasia  SKIN: no rashes          Data:     Lab Results   Component Value Date    WBC 1.4 (L) 08/10/2017    WBC 1.1 (L) 08/09/2017    WBC  08/09/2017     Unsatisfactory specimen - clotted  Notified Lynda Tvedt 6D, 1030 8/9/17 by       HGB 13.0 08/10/2017    HGB 12.6 08/09/2017    HGB  08/09/2017     Unsatisfactory specimen - clotted  Notified Lynda Tvedt 6D, 1030 8/9/17 by       HCT 38.7 08/10/2017    HCT 38.0 08/09/2017    HCT  08/09/2017     Unsatisfactory specimen - clotted  Notified Lynda Tvedt 6D, 1030 8/9/17 by       PLT 97 (L) 08/10/2017     (L) 08/09/2017    PLT   08/09/2017     Unsatisfactory specimen - clotted  Notified Lynda Tvedt 6D, 1030 8/9/17 by        08/09/2017     08/08/2017    POTASSIUM 4.0 08/09/2017    POTASSIUM 3.6 08/08/2017    CHLORIDE 103 08/09/2017    CHLORIDE 101 08/08/2017    CO2 25 08/09/2017    CO2 28 08/08/2017    BUN 13 08/09/2017    BUN 11 08/08/2017    CR 0.78 08/09/2017    CR 0.75 08/08/2017    GLC 86 08/09/2017     (H) 08/08/2017    SED 6 08/09/2017    SED  08/09/2017     Unsatisfactory specimen - clotted  Notified Lynda Tvedt 6D, 1030 8/9/17 by       AST 21 08/08/2017    ALT 28 08/08/2017    ALKPHOS 63 08/08/2017    BILITOTAL 0.2 08/08/2017

## 2017-08-10 NOTE — CONSULTS
[facility], [place]    Hematatology Consultation    Date of Admission:  8/8/2017    Assessment & Plan   Kiera Macedo is a 61 year old woman with no significant PMHx presenting with fever, leukopenia, and mild thrombocytopenia at this time unclear etiology.    She in the month of May has has two fevers two weeks apart associated with associated large joint pain at that time her CBC at OSH was wnl (7.2>13<229) no diff present and SAMIR was negative.  Since then she has had no repeat fevers until 8/8/17 when it was 102.  At that time she presented to OSH facility where she was told to go to the ED and she chose to be seen at the [facility].  On admission 8/8/17 her WBC was 1.7 with  and .  Her lymphocyte count is going up since admission but 8/10/17 ANC now 300.  Also her plt count on 8/8/17 was 127 and today 97.  Unlikely rheumatologic with the above and on admission CRP and ESR were not elevated.  Possibly viral with tender periauricular left LN, HIV negative, will need CMV, EBV, hepatitis serologies.  Team has already evaluated for lyme and anaplasmosis which was negative.  The only new herbal/medication she is taking is an extract from a mushroom in her coffee called gonaderma.  Her other medications and protein supplement are not new.  Reviewed gonaderma and will need to review further but do not see that it can cause leukopenia.  Unlikely nutritional since this appears to acute by history.  Cannot rule out primary bone marrow disorder she will need CT CAP to look for lymphoma and other malignancies and bone marrow biopsy tomorrow morning for extensive evaluation.  On exam no significant finding except shotty nodes at neck and a couple of bruises on right forearm.  When we do the marrow we will also take aspirate for fungal and afb culture.    There is a possibility that we will not find the source.    Does note gum bleeding with brushing teeth for one day.  Will need coags  for further evaluation    -- order CMV pCR, EBV PCR, hepatitis B sag/sab, hep C ab, INR, PTT, fibrinogen  --- CT chest abdomen pelvis with contrast  --  bone marrow bx tomorrow scheduled at 9:30am.  -- treat for neutropenic fever if temp >100.4  -- plan has been discussed with primary team      Case and plan discussed with Dr. Thea Salas MD  PGY4  Hematology Oncology Fellow    Attending Note:  I have reviewed the patient chart, and interviewed and examined the patient.  I agree with the assessment and plan. Her symptoms are all quite vague and almost surprisng that CRP isn't at least slightly elevated. She clearly had developed acute neutropenia. Since she had similar symptoms in May and June, I am concerned about the possibility of something more than a self-limited viral syndrome.With only vague joint sx and unimpressive physical findings and low CRP, doubt rheumatologic disorder. Has some shotty lymphadenopathy, small chance of lymphoma or Hodgkin's disease.  Recommend CT chest/abd and pelvis and bone marrow biopsy primarily looking for lymphoma, also send AFB and fungal cultures.    Magali Sidhu MD  Hematology    Reason for Consult   Reason for consult: acute leukopenia and mild thrombocytopenia    Primary Care Physician   Annette Gabriel    Chief Complaint   Fever and joint pain    History of Present Illness       Kiera Macedo is a 61 year old female with past medical history of 1 level spinal fusion who presented to the ED from  on 8/8 admitted for intermittent fevers and leukopenia. Notes the fevers started in May when she had a fever for about 1.5 days with joint aches which both resolved and then recurred about every 1-2 weeks until mid-June when they stopped. The fevers coincide with aching joints mostly in her hips, shoulders and ankles. Denies any other associated symptoms: weight loss, loss of appetite, mouth sores, shortness of breath, cough, sore throat, chest pain, nausea,  vomiting, diarrhea, constipation, confusion, fatigue, vision changes, edema, numbness, tingling, lightheadedness, headache or urinary symptoms. She complains of bruises on her forearms, gums bleeding this am when she brushed her teeth. She complains of both hips being very painful, mainly when still, not when walking. Knees also bothering her.  No small joints with [ain.  Rather vague about symptoms, had to ask many questions to pull out information.      She presented to urgent care a few times during the 6 weeks with labs (CBC, stool cultures, blood cultures, UA, SAMIR, sed rate, ESR, CCP ab, babesia, CMP, lyme, anaplasma) all returning unremarkable and CXR negative for acute process.     Between June and now patient notes she felt completely well. The fever started again today with chills this afternoon - temp was 99 at home and she checked it every few hours and continued to increase by 0.2 degrees so she went to urgent care. Today she endorses mild headache, sinus congestion and some bruising on her forearms. At urgent care 8/8/17  WBC returned with new neutropenia of 1000 and lymphopenia of 600 and in the setting of fever to 102 recommended patient go to ER.     She states her current symptoms are similar to when she had a tick bourne disease (anaplasma likely - lyme and babesia negative at the time) last summer and was treated with doxycyline which improved her symptoms. She also reports she had low platelets (74k) at that time and platelet transfusion was recommended, but no documentation it ws done. . Travels monthly. Within the last year to [place], [place], [place] ([place] and [place]. No issues with illness during or after the trips. She has two dogs at home, avid  and currently works three jobs:  B&B owner, realtor and online . Only takes medications listed below and multivitamin, calcium, coQ10 and tart cherry juice. A week ago started drinking ganoderma coffee, which has  sunnyishi mushroom in it, which has been associated with bleeding.      In the ER8/8/17, HR, RR and BP were all within normal limits with a temperature of 99.6F. Labs ordered included blood culture, UA, CMP, CBC, lactate, reticulocyte count, and procal. CXR without any acute findings. UA, procal, lactic acid, CMP were normal. CBC with , , platelets 127 which all were completely normal 6/1/17.       Past Medical History    cspine fusion  Complications with delivery of children: csection, toxic shock, uterine rupture  Restless leg  Herpes flares    Prior to Admission Medications   Prior to Admission Medications   Prescriptions Last Dose Informant Patient Reported? Taking?   AMITRIPTYLINE HCL PO 8/7/2017 at Unknown time  Yes Yes   Sig: Take 25 mg by mouth At Bedtime   ValACYclovir HCl (VALTREX PO) 8/8/2017 at Unknown time  Yes Yes   Sig: Take 250 mg by mouth daily      Facility-Administered Medications: None     Allergies   Allergies   Allergen Reactions     Ampicillin Diarrhea       Social History   Drinks 1 drink daily  Dose not smoke  No drugs  Boyfriend  Runs a [employer] in her home and has other side jobs  2 dogs  Loves to travel and be out in nature    Family History   Heart disease  No bleeding or blotting disorders    Review of Systems   The 10 point Review of Systems is negative other than noted in the HPI or here.     Physical Exam   Temp: 98.7  F (37.1  C) Temp src: Oral BP: 117/79 Pulse: 84 Heart Rate: 73 Resp: 16 SpO2: 97 % O2 Device: None (Room air)    Vital Signs with Ranges  Temp:  [98.7  F (37.1  C)-100.2  F (37.9  C)] 98.7  F (37.1  C)  Pulse:  [84] 84  Heart Rate:  [73-79] 73  Resp:  [16] 16  BP: (108-117)/(73-79) 117/79  SpO2:  [95 %-97 %] 97 %  0 lbs 0 oz  GEN: comfortable, in no distress  HEENT: atraumatic, sclera anicteric, no oral thursh  Neck: shotty nodes in neck, one tender one at left periauricular area  Chest: no tenderness to palpation  CV: RRR, S1/2 present no mrg, no LE peripheral  "edema  LUNG: comfortable on room air, CTAB, no wheezing rales or rhonchi  Abdomen: no distension, no tenderness to palpation, no hepatosplenomegaly  MSK: no gross deformities.  No erythema, edema or tenderness of elbows, wrists, hands, ankles.   SKIN: warm, dry, no rash, 3-4 ~ 1 cm ecchymoses at right forearm and similar stages of healing  NEURO: no gross abnormalities, alert and oriented x3  PSYCH: appropriate affect and mood.  Lymph: no LAD at axilla and groin    Data   -Data reviewed today: All pertinent laboratory and imaging results from this encounter were reviewed.   Recent Labs  Lab 08/10/17  0732 08/09/17  1056 08/09/17  0914 08/08/17  2024   WBC 1.4* 1.1* Unsatisfactory specimen - clottedNotified Lynda Tvedt 6D, 1030 8/9/17 by SH 1.7*   HGB 13.0 12.6 Unsatisfactory specimen - clottedNotified Lynda Tvedt 6D, 1030 8/9/17 by SH 12.4   MCV 92 92 Unsatisfactory specimen - clottedNotified Lynda Tvedt 6D, 1030 8/9/17 by SH 91   PLT 97* 108* Unsatisfactory specimen - clottedNotified Lynda Tvedt 6D, 1030 8/9/17 by *   NA  --   --  136 137   POTASSIUM  --   --  4.0 3.6   CHLORIDE  --   --  103 101   CO2  --   --  25 28   BUN  --   --  13 11   CR  --   --  0.78 0.75   ANIONGAP  --   --  8 8   BC  --   --  8.2* 8.5   GLC  --   --  86 140*   ALBUMIN  --   --   --  4.0   PROTTOTAL  --   --   --  7.1   BILITOTAL  --   --   --  0.2   ALKPHOS  --   --   --  63   ALT  --   --   --  28   AST  --   --   --  21       No results found for this or any previous visit (from the past 24 hour(s)).      Peripheral Blood Smear 8/8/17  FINAL DIAGNOSIS:   Peripheral Blood Smear:   -Marked leukopenia; neutropenia; lymphocytopenia   -Slight thrombocytopenia     COMMENT:   If the purpose of this smear was to evaluate for bloodborn parasitic   infection, please order \"parasite stain\" through the microbiology   laboratory for more sensitive thick-thin smear evaluation.   "

## 2017-08-11 ENCOUNTER — APPOINTMENT (OUTPATIENT)
Dept: ULTRASOUND IMAGING | Facility: CLINIC | Age: 62
DRG: 810 | End: 2017-08-11
Payer: COMMERCIAL

## 2017-08-11 DIAGNOSIS — D69.6 THROMBOCYTOPENIA (H): ICD-10-CM

## 2017-08-11 DIAGNOSIS — D70.9 NEUTROPENIA, UNSPECIFIED TYPE (H): Primary | ICD-10-CM

## 2017-08-11 LAB
APTT PPP: 28 SEC (ref 22–37)
BASOPHILS # BLD AUTO: 0 10E9/L (ref 0–0.2)
BASOPHILS NFR BLD AUTO: 1.4 %
COPATH REPORT: NORMAL
DIFFERENTIAL METHOD BLD: ABNORMAL
EOSINOPHIL # BLD AUTO: 0 10E9/L (ref 0–0.7)
EOSINOPHIL NFR BLD AUTO: 1 %
ERYTHROCYTE [DISTWIDTH] IN BLOOD BY AUTOMATED COUNT: 12.7 % (ref 10–15)
FIBRINOGEN PPP-MCNC: 261 MG/DL (ref 200–420)
HBV SURFACE AB SERPL IA-ACNC: 0.02 M[IU]/ML
HBV SURFACE AG SERPL QL IA: NONREACTIVE
HCT VFR BLD AUTO: 41.3 % (ref 35–47)
HCV AB SERPL QL IA: NORMAL
HGB BLD-MCNC: 13.8 G/DL (ref 11.7–15.7)
IMM GRANULOCYTES # BLD: 0 10E9/L (ref 0–0.4)
IMM GRANULOCYTES NFR BLD: 0 %
INR PPP: 0.97 (ref 0.86–1.14)
LDH SERPL L TO P-CCNC: 206 U/L (ref 81–234)
LYMPHOCYTES # BLD AUTO: 1.4 10E9/L (ref 0.8–5.3)
LYMPHOCYTES NFR BLD AUTO: 64.8 %
MCH RBC QN AUTO: 30.3 PG (ref 26.5–33)
MCHC RBC AUTO-ENTMCNC: 33.4 G/DL (ref 31.5–36.5)
MCV RBC AUTO: 91 FL (ref 78–100)
MONOCYTES # BLD AUTO: 0.3 10E9/L (ref 0–1.3)
MONOCYTES NFR BLD AUTO: 12.4 %
NEUTROPHILS # BLD AUTO: 0.4 10E9/L (ref 1.6–8.3)
NEUTROPHILS NFR BLD AUTO: 20.4 %
NRBC # BLD AUTO: 0 10*3/UL
NRBC BLD AUTO-RTO: 0 /100
PLATELET # BLD AUTO: 87 10E9/L (ref 150–450)
PLATELET # BLD EST: ABNORMAL 10*3/UL
RBC # BLD AUTO: 4.55 10E12/L (ref 3.8–5.2)
WBC # BLD AUTO: 2.1 10E9/L (ref 4–11)

## 2017-08-11 PROCEDURE — 40001005 ZZHCL STATISTIC FLOW >15 ABY TC 88189: Performed by: STUDENT IN AN ORGANIZED HEALTH CARE EDUCATION/TRAINING PROGRAM

## 2017-08-11 PROCEDURE — 85730 THROMBOPLASTIN TIME PARTIAL: CPT | Performed by: STUDENT IN AN ORGANIZED HEALTH CARE EDUCATION/TRAINING PROGRAM

## 2017-08-11 PROCEDURE — 87340 HEPATITIS B SURFACE AG IA: CPT | Performed by: STUDENT IN AN ORGANIZED HEALTH CARE EDUCATION/TRAINING PROGRAM

## 2017-08-11 PROCEDURE — 07DR3ZX EXTRACTION OF ILIAC BONE MARROW, PERCUTANEOUS APPROACH, DIAGNOSTIC: ICD-10-PCS | Performed by: INTERNAL MEDICINE

## 2017-08-11 PROCEDURE — 76830 TRANSVAGINAL US NON-OB: CPT

## 2017-08-11 PROCEDURE — 85610 PROTHROMBIN TIME: CPT | Performed by: STUDENT IN AN ORGANIZED HEALTH CARE EDUCATION/TRAINING PROGRAM

## 2017-08-11 PROCEDURE — 88305 TISSUE EXAM BY PATHOLOGIST: CPT | Performed by: STUDENT IN AN ORGANIZED HEALTH CARE EDUCATION/TRAINING PROGRAM

## 2017-08-11 PROCEDURE — 88264 CHROMOSOME ANALYSIS 20-25: CPT | Performed by: STUDENT IN AN ORGANIZED HEALTH CARE EDUCATION/TRAINING PROGRAM

## 2017-08-11 PROCEDURE — 88313 SPECIAL STAINS GROUP 2: CPT | Performed by: STUDENT IN AN ORGANIZED HEALTH CARE EDUCATION/TRAINING PROGRAM

## 2017-08-11 PROCEDURE — 88341 IMHCHEM/IMCYTCHM EA ADD ANTB: CPT | Performed by: STUDENT IN AN ORGANIZED HEALTH CARE EDUCATION/TRAINING PROGRAM

## 2017-08-11 PROCEDURE — 88342 IMHCHEM/IMCYTCHM 1ST ANTB: CPT | Performed by: STUDENT IN AN ORGANIZED HEALTH CARE EDUCATION/TRAINING PROGRAM

## 2017-08-11 PROCEDURE — 88161 CYTOPATH SMEAR OTHER SOURCE: CPT | Performed by: STUDENT IN AN ORGANIZED HEALTH CARE EDUCATION/TRAINING PROGRAM

## 2017-08-11 PROCEDURE — 87798 DETECT AGENT NOS DNA AMP: CPT | Performed by: STUDENT IN AN ORGANIZED HEALTH CARE EDUCATION/TRAINING PROGRAM

## 2017-08-11 PROCEDURE — 00000161 ZZHCL STATISTIC H-SPHEME PROCESS B/S: Performed by: STUDENT IN AN ORGANIZED HEALTH CARE EDUCATION/TRAINING PROGRAM

## 2017-08-11 PROCEDURE — 25000132 ZZH RX MED GY IP 250 OP 250 PS 637: Performed by: STUDENT IN AN ORGANIZED HEALTH CARE EDUCATION/TRAINING PROGRAM

## 2017-08-11 PROCEDURE — 25000125 ZZHC RX 250

## 2017-08-11 PROCEDURE — 88185 FLOWCYTOMETRY/TC ADD-ON: CPT | Performed by: STUDENT IN AN ORGANIZED HEALTH CARE EDUCATION/TRAINING PROGRAM

## 2017-08-11 PROCEDURE — 88184 FLOWCYTOMETRY/ TC 1 MARKER: CPT | Performed by: STUDENT IN AN ORGANIZED HEALTH CARE EDUCATION/TRAINING PROGRAM

## 2017-08-11 PROCEDURE — 85025 COMPLETE CBC W/AUTO DIFF WBC: CPT | Performed by: STUDENT IN AN ORGANIZED HEALTH CARE EDUCATION/TRAINING PROGRAM

## 2017-08-11 PROCEDURE — 36415 COLL VENOUS BLD VENIPUNCTURE: CPT | Performed by: STUDENT IN AN ORGANIZED HEALTH CARE EDUCATION/TRAINING PROGRAM

## 2017-08-11 PROCEDURE — 12000001 ZZH R&B MED SURG/OB UMMC

## 2017-08-11 PROCEDURE — 87116 MYCOBACTERIA CULTURE: CPT | Performed by: STUDENT IN AN ORGANIZED HEALTH CARE EDUCATION/TRAINING PROGRAM

## 2017-08-11 PROCEDURE — G0364 BONE MARROW ASPIRATE &BIOPSY: HCPCS | Mod: GC | Performed by: INTERNAL MEDICINE

## 2017-08-11 PROCEDURE — 85384 FIBRINOGEN ACTIVITY: CPT | Performed by: STUDENT IN AN ORGANIZED HEALTH CARE EDUCATION/TRAINING PROGRAM

## 2017-08-11 PROCEDURE — 40000424 ZZHCL STATISTIC BONE MARROW CORE PERF TC 38221: Performed by: STUDENT IN AN ORGANIZED HEALTH CARE EDUCATION/TRAINING PROGRAM

## 2017-08-11 PROCEDURE — 40000951 ZZHCL STATISTIC BONE MARROW INTERP TC 85097: Performed by: STUDENT IN AN ORGANIZED HEALTH CARE EDUCATION/TRAINING PROGRAM

## 2017-08-11 PROCEDURE — 87103 BLOOD FUNGUS CULTURE: CPT | Performed by: STUDENT IN AN ORGANIZED HEALTH CARE EDUCATION/TRAINING PROGRAM

## 2017-08-11 PROCEDURE — 38221 DX BONE MARROW BIOPSIES: CPT | Mod: GC | Performed by: INTERNAL MEDICINE

## 2017-08-11 PROCEDURE — 00000058 ZZHCL STATISTIC BONE MARROW ASP PERF TC 38220: Performed by: STUDENT IN AN ORGANIZED HEALTH CARE EDUCATION/TRAINING PROGRAM

## 2017-08-11 PROCEDURE — 86803 HEPATITIS C AB TEST: CPT | Performed by: STUDENT IN AN ORGANIZED HEALTH CARE EDUCATION/TRAINING PROGRAM

## 2017-08-11 PROCEDURE — 99232 SBSQ HOSP IP/OBS MODERATE 35: CPT | Mod: GC | Performed by: INTERNAL MEDICINE

## 2017-08-11 PROCEDURE — 76536 US EXAM OF HEAD AND NECK: CPT

## 2017-08-11 PROCEDURE — 40000795 ZZHCL STATISTIC DNA PROCESS AND HOLD: Performed by: INTERNAL MEDICINE

## 2017-08-11 PROCEDURE — 88311 DECALCIFY TISSUE: CPT | Performed by: STUDENT IN AN ORGANIZED HEALTH CARE EDUCATION/TRAINING PROGRAM

## 2017-08-11 PROCEDURE — 40000611 ZZHCL STATISTIC MORPHOLOGY W/INTERP HEMEPATH TC 85060: Performed by: STUDENT IN AN ORGANIZED HEALTH CARE EDUCATION/TRAINING PROGRAM

## 2017-08-11 PROCEDURE — 88280 CHROMOSOME KARYOTYPE STUDY: CPT | Performed by: STUDENT IN AN ORGANIZED HEALTH CARE EDUCATION/TRAINING PROGRAM

## 2017-08-11 PROCEDURE — 25000128 H RX IP 250 OP 636: Performed by: STUDENT IN AN ORGANIZED HEALTH CARE EDUCATION/TRAINING PROGRAM

## 2017-08-11 PROCEDURE — 25000128 H RX IP 250 OP 636: Performed by: INTERNAL MEDICINE

## 2017-08-11 PROCEDURE — 83615 LACTATE (LD) (LDH) ENZYME: CPT | Performed by: STUDENT IN AN ORGANIZED HEALTH CARE EDUCATION/TRAINING PROGRAM

## 2017-08-11 PROCEDURE — 86706 HEP B SURFACE ANTIBODY: CPT | Performed by: STUDENT IN AN ORGANIZED HEALTH CARE EDUCATION/TRAINING PROGRAM

## 2017-08-11 PROCEDURE — 99233 SBSQ HOSP IP/OBS HIGH 50: CPT | Mod: GC | Performed by: INTERNAL MEDICINE

## 2017-08-11 PROCEDURE — 88237 TISSUE CULTURE BONE MARROW: CPT | Performed by: STUDENT IN AN ORGANIZED HEALTH CARE EDUCATION/TRAINING PROGRAM

## 2017-08-11 RX ORDER — LIDOCAINE HYDROCHLORIDE 10 MG/ML
12 INJECTION, SOLUTION EPIDURAL; INFILTRATION; INTRACAUDAL; PERINEURAL ONCE
Status: COMPLETED | OUTPATIENT
Start: 2017-08-11 | End: 2017-08-11

## 2017-08-11 RX ORDER — LIDOCAINE HYDROCHLORIDE 10 MG/ML
INJECTION, SOLUTION EPIDURAL; INFILTRATION; INTRACAUDAL; PERINEURAL
Status: DISCONTINUED
Start: 2017-08-11 | End: 2017-08-11 | Stop reason: WASHOUT

## 2017-08-11 RX ORDER — LIDOCAINE HYDROCHLORIDE 10 MG/ML
INJECTION, SOLUTION EPIDURAL; INFILTRATION; INTRACAUDAL; PERINEURAL
Status: COMPLETED
Start: 2017-08-11 | End: 2017-08-11

## 2017-08-11 RX ORDER — LIDOCAINE HYDROCHLORIDE 10 MG/ML
INJECTION, SOLUTION EPIDURAL; INFILTRATION; INTRACAUDAL; PERINEURAL
Status: DISCONTINUED
Start: 2017-08-11 | End: 2017-08-11 | Stop reason: HOSPADM

## 2017-08-11 RX ADMIN — Medication 250 MG: at 08:30

## 2017-08-11 RX ADMIN — LIDOCAINE HYDROCHLORIDE 120 MG: 10 INJECTION, SOLUTION EPIDURAL; INFILTRATION; INTRACAUDAL; PERINEURAL at 10:38

## 2017-08-11 RX ADMIN — ROPINIROLE HYDROCHLORIDE 0.25 MG: 0.25 TABLET, FILM COATED ORAL at 08:30

## 2017-08-11 RX ADMIN — SODIUM CHLORIDE 1000 ML: 9 INJECTION, SOLUTION INTRAVENOUS at 17:00

## 2017-08-11 RX ADMIN — ACETAMINOPHEN 650 MG: 325 TABLET, FILM COATED ORAL at 21:08

## 2017-08-11 RX ADMIN — AMITRIPTYLINE HYDROCHLORIDE 25 MG: 25 TABLET, FILM COATED ORAL at 22:11

## 2017-08-11 RX ADMIN — MIDAZOLAM HYDROCHLORIDE 0.5 MG: 1 INJECTION, SOLUTION INTRAMUSCULAR; INTRAVENOUS at 09:15

## 2017-08-11 ASSESSMENT — PAIN DESCRIPTION - DESCRIPTORS: DESCRIPTORS: ACHING

## 2017-08-11 NOTE — PROCEDURES
HEMATOLOGY Adult Bone Marrow Biopsy Procedure Note  August 11, 2017  BP (!) 112/98 (BP Location: Right arm)  Pulse 84  Temp 98.5  F (36.9  C) (Oral)  Resp 14  Wt 62 kg (136 lb 9.6 oz)  SpO2 96%     Learning needs assessment complete within 12 months? N?A    DIAGNOSIS: leukopenia and thrombocytopenia    PROCEDURE: Unilateral Bone Marrow Biopsy    LOCATION: Inpatient [facility]    Patient s identification was positively verified by patient identification band and invasive procedure safety checklist was completed. Informed consent was obtained. Following the administration of versed as pre-medication, patient was placed in the prone position and prepped and draped in a sterile manner. Approximately 12 cc of 1% Lidocaine was used over the right posterior iliac spine. Following this a 3 mm incision was made. Trephine bone marrow core(s) was (were) obtained from the [facility]. Bone marrow aspirates were obtained from the [facility]. Aspirates were sent for morphology, immunophenotyping, cytogenetics and afb/fungal culture. A total of approximately 20 ml of marrow was aspirated. Following this procedure a sterile dressing was applied to the bone marrow biopsy site(s). The patient was placed in the supine position to maintain pressure on the biopsy site. Post-procedure wound care instructions were given.     Complications: NO    Pre-procedural pain: 0 out of 10 on the numeric pain rating scale.     Procedural pain: Patient noted she was uncomfortable  Post-procedural pain assessment: no pain but nauseas after    Interventions: NO    Length of procedure:21 minutes to 45 minutes    Procedure performed by: Nadine Salas MD    Attending attestation:  I was present and observed the entire procedure.   Magali Sidhu MD  Hematology

## 2017-08-11 NOTE — PROGRESS NOTES
[facility] Internal Medicine Progress Note          Assessment and Plan:   Assessment:   Kiera Macedo is a 62 yo F with PMH of 1 level spinal fusion who presented to [facility] ED from [place] on 8/8 for intermittent fevers with chills from May until Mid-June and was found to be neutropenic and leukopenic (, ) upon visit to urgent care.       Plan:   Changes today (8/11):   - Bone marrow biopsy   - Transvaginal US   - Thyroid US   - IVF given for orthostatic hypotension/dizziness    #Fever   #Lymphopenia  #Absolute Neutropenia  #Thrombocytopenia  Tick-borne illness vs bone marrow infiltrative vs viral illness. Viral illness is most likely, but has no sick contacts or other localizing symptoms. Has not had any known tick exposures or rashes. Unlikely bone marrow process due to normal Hgb without night sweats or bone pain. Admitted with , , WBC 1.7K, . (8/9) labs: WBC 1.1K, , , .(8/10) labs: WBC 1.4K, , . HIV negative, TSH and LFTs normal. Lactic acid normal. ESR normal. Anaplasma negative per [facility]. WBC improved to 2.1, , ALC 1400    - Recheck CBC in AM   - Neutropenic precautions    - If febrile overnight, consider ID consult in AM   - Heme/onc consulted, appreciate recs      = Neg:  Hep B surface ab, Hep B Surface ag, Hep C,       - Viral work up pending: EBV, CMV, INR, PTT       - CT Chest/Ab/Pelvis w/ contrast       - Bone marrow biopsy today, slides pending   - Blood smear pending   - Blood cultures pending   - Tickborne serologies pending for outside urgent care - Babesia pending      - Negative for Lyme serologies.     #Joint Pains  Likely related to causation of fevers due to timing following with febrile episodes. Denies muscle pains or current joint pains. Negative for edema or rashes   - Tylenol PRN    # R adnexial lesion: Noted on CT scan. Obtaining pelvic US to better evaluate per Radiology recs        Diet: Regular  Diet Adult  Fluids: PO, no IVF indicated  DVT Prophylaxis: Ambulate every shift  Code Status: Full Code  Dispo: possibly tomorrow.     Patient discussed with Dr. Thorpe, who agrees with the assessment and plan.     Wendy Samuels MD  Internal Medicine, PGY 1  [phone]        Interval History:   Had pain with bone marrow biopsy, agreeable to ultrasounds today for full work up. 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 Hoping to go home tomorrow. Feeling much improved today. Dizzy in afternoon, with positive orthostatics.            Significant Problems:     Past Medical History:   Diagnosis Date     Herpes              Review of Systems:   A comprehensive review of systems was performed and found to be negative except as described in this note           Medications:     Prescriptions Prior to Admission   Medication Sig Dispense Refill Last Dose     AMITRIPTYLINE HCL PO Take 25 mg by mouth At Bedtime   8/7/2017 at Unknown time     ValACYclovir HCl (VALTREX PO) Take 250 mg by mouth daily   8/8/2017 at Unknown time        Physical Exam:  /71 (BP Location: Left arm)  Pulse 84  Temp 98.6  F (37  C) (Oral)  Resp 16  Wt 62 kg (136 lb 9.6 oz)  SpO2 96%  GEN: NAD, laying in bed, pleasant, non-toxic  HEENT: EOMI, no scleral icterus  CV: RRR, no M/R/G appreciated, normal S1/S2  PULM: CTAB, good effort, no increased work of breathing  ABD: Normal BS, non-tender, non-distended  EXT: No edema, ulcerations, large bandage present on right hip from bone marrow biopsy, no erythema or leakage.    NEURO: EOMI, appropriate mentation, no aphasia  SKIN: no rashes          Data:     Lab Results   Component Value Date    WBC 2.1 (L) 08/11/2017    WBC 1.4 (L) 08/10/2017    WBC 1.1 (L) 08/09/2017    HGB 13.8 08/11/2017    HGB 13.0 08/10/2017    HGB 12.6 08/09/2017    HCT 41.3 08/11/2017    HCT 38.7 08/10/2017    HCT 38.0 08/09/2017    PLT 87 (L) 08/11/2017    PLT 97 (L) 08/10/2017     (L) 08/09/2017     08/09/2017      08/08/2017    POTASSIUM 4.0 08/09/2017    POTASSIUM 3.6 08/08/2017    CHLORIDE 103 08/09/2017    CHLORIDE 101 08/08/2017    CO2 25 08/09/2017    CO2 28 08/08/2017    BUN 13 08/09/2017    BUN 11 08/08/2017    CR 0.78 08/09/2017    CR 0.75 08/08/2017    GLC 86 08/09/2017     (H) 08/08/2017    SED 6 08/09/2017    SED  08/09/2017     Unsatisfactory specimen - clotted  Notified Lynda Tvedt 6D, 1030 8/9/17 by SH      AST 21 08/08/2017    ALT 28 08/08/2017    ALKPHOS 63 08/08/2017    BILITOTAL 0.2 08/08/2017    INR 0.97 08/11/2017     Pelvic w/ Transvaginal US: 8/11:  IMPRESSION:   1.  The right adnexal lesion seen on prior CT is not well-visualized  on the current ultrasound. There is an area of homogeneous low level  internal echogenicity without internal vascularity, measuring  approximately 4 cm in the right adnexa seen on transabdominal but not  transvaginal imaging, suggestive of an endometrioma. Comparison with  prior outside imaging if available would be beneficial. If clinically  indicated, further evaluation could be obtained with a nonemergent  pelvic MRI.  2.  Normal-appearing uterus and left ovary.    Thyroid US: 8/11:  Impression:     Multinodular thyroid gland with bilateral benign colloid cysts. No  suspicious solid nodules are noted.    CT Chest/Ab/Pelvis: 8/10:  IMPRESSION:   1. 3.5 x 3.9 x 3.6 cm hypoattenuated right adnexal solid mass.  Consider pelvic MR/ultrasound.  2. Nonspecific mild posterior pleural nodular thickening.  3. 4 mm hypoattenuated hepatic dome foci, too small to characterize.  4. Multinodular thyroid gland. Recommend for thyroid ultrasound.  5. Nonobstructive left renal stone      Physician Attestation  I, Carlitos Thorpe MD, saw this patient with the resident and agree with the resident s findings and plan of care as documented in the resident s note with my edits.     I personally reviewed vital signs, medications, labs and imaging.    Carlitos Thorpe MD  Date of Service (when I saw  the patient): 8/11/17

## 2017-08-11 NOTE — PROGRESS NOTES
[facility], [place]    Hematatology Progress Note    Date of Admission:  8/8/2017    Assessment & Plan   Kiera Macedo is a 61 year old woman with no significant PMHx presenting with fever, leukopenia, and mild thrombocytopenia at this time unclear etiology.    She in the month of May has has two fevers two weeks apart associated with associated large joint pain at that time her CBC at OSH was wnl (7.2>13<229) no diff present and SAMIR was negative.  Since then she has had no repeat fevers until 8/8/17 when it was 102.  At that time she presented to OSH facility where she was told to go to the ED and she chose to be seen at the [facility].  On admission 8/8/17 her WBC was 1.7 with  and .  Her lymphocyte count is going up since admission but 8/10/17 ANC now 300.  Also her plt count on 8/8/17 was 127 and today 97.  Unlikely rheumatologic with the above and on admission CRP and ESR were not elevated.  Possibly viral with tender periauricular left LN, HIV negative, will need CMV, EBV, hepatitis serologies.  Team has already evaluated for lyme and anaplasmosis which was negative.  The only new herbal/medication she is taking is an extract from a mushroom in her coffee called gonaderma.  Her other medications and protein supplement are not new.  Reviewed gonaderma and will need to review further but do not see that it can cause leukopenia.  Unlikely nutritional since this appears to acute by history.  Cannot rule out primary bone marrow disorder she will need CT CAP to look for lymphoma and other malignancies and bone marrow biopsy tomorrow morning for extensive evaluation.  On exam no significant finding except shotty nodes at neck and a couple of bruises on right forearm.  When we do the marrow we will also take aspirate for fungal and afb culture.    There is a possibility that we will not find the source.    -- pending complete work up  --- CT chest abdomen pelvis with contrast  (mass next to right ovary and abnormal thyroid) primary team has ordered further studies  --  bone marrow bx performed today pending results, if flow returns back normal she likely can be discharged and Dr. Sidhu will arrange follow up.  -- treat for neutropenic fever if temp >100.4  -- plan has been discussed with primary team      Case and plan discussed with Dr. Thea Salas MD  PGY4  Hematology Oncology Fellow    Attending Note:  I have reviewed the patient chart, and interviewed and examined the patient.  I agree with the assessment and plan. Prelim on flow cytometry is no abnormal cells to suggest leukemia or lymphoma, so if stable overnight with no high fevers or bleeding, she would be ok to go home after imaging studies.  She should have  CBC checked on Tuesday 8/15 some time before noon- she lives in [place], so can go to [facility].  I will have orders in place. I will contact her Tuesday afternoon about hte CBC results and should have most of marrow results by then. From there, will decide if she need further f/u with me in clinic on 8/24.  I gave her my card with clinic number.   Magali Sidhu MD  Hematology      Patient summary    Kiera Macedo is a 61 year old female prior healthy woman who has had off and on fever with associated joint pain for the past couple of months, she now presents with severe Neutropenia  and mild thrombocytopenia     Subjective    Patient reports she is feeling slightly better.  Today no gum bleeding when brushing her teeth.    Physical Exam   Temp: 98.6  F (37  C) Temp src: Oral BP: 114/71 Pulse: 84 Heart Rate: 65 Resp: 16 SpO2: 96 % O2 Device: None (Room air)    Vital Signs with Ranges  Temp:  [98.3  F (36.8  C)-99.5  F (37.5  C)] 98.6  F (37  C)  Pulse:  [84] 84  Heart Rate:  [65-83] 65  Resp:  [14-16] 16  BP: (105-119)/(70-98) 114/71  SpO2:  [95 %-98 %] 96 %  136 lbs 9.6 oz  GEN: comfortable, in no distress  HEENT: atraumatic, sclera  anicteric  CV: RRR  LUNG: comfortable on room air  Abdomen: no distension  MSK: no gross deformities.  No erythema, edema or tenderness of elbows, wrists, hands, ankles.   SKIN: warm, dry, no rash, 3-4 ~ 1 cm ecchymoses at right forearm and similar stages of healing  PSYCH: appropriate affect and mood.      Data   -Data reviewed today: All pertinent laboratory and imaging results from this encounter were reviewed.     Recent Labs  Lab 08/11/17  0742 08/10/17  0732 08/09/17  1056 08/09/17  0914 08/08/17  2024   WBC 2.1* 1.4* 1.1* Unsatisfactory specimen - clottedNotified Lynda Tvedt 6D, 1030 8/9/17 by  1.7*   HGB 13.8 13.0 12.6 Unsatisfactory specimen - clottedNotified Lynda Tvedt 6D, 1030 8/9/17 by SH 12.4   MCV 91 92 92 Unsatisfactory specimen - clottedNotified Lynda Tvedt 6D, 1030 8/9/17 by SH 91   PLT 87* 97* 108* Unsatisfactory specimen - clottedNotified Lynda Tvedt 6D, 1030 8/9/17 by *   INR 0.97  --   --   --   --    NA  --   --   --  136 137   POTASSIUM  --   --   --  4.0 3.6   CHLORIDE  --   --   --  103 101   CO2  --   --   --  25 28   BUN  --   --   --  13 11   CR  --   --   --  0.78 0.75   ANIONGAP  --   --   --  8 8   BC  --   --   --  8.2* 8.5   GLC  --   --   --  86 140*   ALBUMIN  --   --   --   --  4.0   PROTTOTAL  --   --   --   --  7.1   BILITOTAL  --   --   --   --  0.2   ALKPHOS  --   --   --   --  63   ALT  --   --   --   --  28   AST  --   --   --   --  21         Recent Results (from the past 24 hour(s))   CT Chest/Abdomen/Pelvis w Contrast   Result Value    Radiologist flags Right adnexal solid mass, thyroid nodules    Narrative    EXAMINATION: CT chest, abdomen and pelvis, 8/10/2017 6:30 PM    TECHNIQUE:  Helical CT images from the thoracic inlet through the  symphysis pubis were obtained  with contrast. Contrast dose: isovue  370    COMPARISON: Chest radiograph 8/8/2017.    HISTORY: severe neutropenia in setting of recent fever with unknown  cause. Needs to be done by  tomorrow morning due to bone marrow biopsy  tomorrow      FINDINGS:    Chest: Multiple hypoattenuated thyroid gland nodules. No cardiomegaly.  No pericardial effusion. Multiple prominent axillary lymph nodes.  Heterogeneous nodularity within the anterior mediastinum, likely  thymus or rebound. 5 mm in short axis perivascular lymph node. No  suspicious mediastinal adenopathy.    Central tracheobronchial tree is patent. No pneumothorax. No pleural  effusion. Minimal bibasilar atelectasis. Mild right greater than left  posterior pleural nodularities.    Abdomen and pelvis: Small 4 mm hypoattenuated foci within the hepatic  dome (series 3, image 218. No enhancing hepatic lesion. Gallbladder,  adrenal glands, pancreas are unremarkable. No splenomegaly. No  hydronephrosis, hydroureter or renal mass. Small nonobstructive left  renal stone. No bowel obstruction. No focal bowel wall thickening.  Appendix is normal caliber. Right adnexal 3.5 x 3.9 x 3.6 cm slightly  hypoattenuated mass (series 3, image 497). Uterus is unremarkable.  Multiple subcentimeter inguinal lymph nodes. The major abdominal  vessels are patent. No suspicious adenopathy within the abdomen and  pelvis. No free air or fluid within the abdomen and pelvis.    Bones and soft tissues: No acute or suspicious osseous lesion.  Cervical spine plate fixation.      Impression    IMPRESSION:   1. 3.5 x 3.9 x 3.6 cm hypoattenuated right adnexal solid mass.  Consider pelvic MR/ultrasound.  2. Nonspecific mild posterior pleural nodular thickening.  3. 4 mm hypoattenuated hepatic dome foci, too small to characterize.  4. Multinodular thyroid gland. Recommend for thyroid ultrasound.  5. Nonobstructive left renal stone.       [Consider Follow Up: Right adnexal solid mass, thyroid nodules]    This report will be copied to the [facility] to ensure a  provider acknowledges the finding.       I have personally reviewed the examination and initial  "interpretation  and I agree with the findings.    JONO AGUAYO MD         Peripheral Blood Smear 8/8/17  FINAL DIAGNOSIS:   Peripheral Blood Smear:   -Marked leukopenia; neutropenia; lymphocytopenia   -Slight thrombocytopenia     COMMENT:   If the purpose of this smear was to evaluate for bloodborn parasitic   infection, please order \"parasite stain\" through the microbiology   laboratory for more sensitive thick-thin smear evaluation.   "

## 2017-08-11 NOTE — PROGRESS NOTES
This patient does not have TB, we only added it to her bone marrow bx to be extra cautious.  She DOES NOT need isolation for TB.    Nadine Salas

## 2017-08-11 NOTE — PROGRESS NOTES
Patient doing well this morning, no pain reported, patient stated she actually got a good nights sleep. Patient is not running a fever and overall vitals are stable. Patient underwent a bone marrow biopsy this morning and results are pending. Patient experienced quite a bit of pain during the procedure and got nauseated near the end. Patient is recovering well now. She needs to lay on her back for 1 hour (until 1120 am) Patient able to make her needs known and has the call light.

## 2017-08-11 NOTE — PROGRESS NOTES
Pt received as transfer from .  Alert and oriented.  Denies pain or other concerns at this time.  Awaiting U/S of thyroid and pelvis.  Resting comfortably.

## 2017-08-11 NOTE — PROGRESS NOTES
Admission of  intermittent fevers/neutropenia. On neutropenic precautions. Pt A&OX4, up ad mariah. Denies pain. Reg diet, no c/o of n/v.Low grade fever T 99.5.VS stable otherwise. Pt declined Tylenol. Patient transferred for OBS status to inpatient. Transfer to  pending availability of rooms. Will continue to monitor.  /85 (BP Location: Left arm)  Pulse 84  Temp 99.5  F (37.5  C) (Oral)  Resp 16  Wt 62 kg (136 lb 9.6 oz)  SpO2 98%

## 2017-08-11 NOTE — PLAN OF CARE
"Problem: Goal Outcome Summary  Goal: Goal Outcome Summary  Outcome: Therapy, progress towards functional goals is fair  Pt resting comfortably.  Had pelvic and thyroid ultrasound done this afternoon.  Denies pain.  Tolerating regular diet.  Voiding spontaneously not saving.  Pt c/o \"dizziness\".  Orthostatic BP and P checked and results paged to MD.  1 liter NS bolus ordered.  Continue to monitor.      "

## 2017-08-12 VITALS
HEART RATE: 70 BPM | WEIGHT: 136.6 LBS | SYSTOLIC BLOOD PRESSURE: 99 MMHG | DIASTOLIC BLOOD PRESSURE: 67 MMHG | RESPIRATION RATE: 16 BRPM | OXYGEN SATURATION: 97 % | TEMPERATURE: 96.5 F

## 2017-08-12 LAB
BASOPHILS # BLD AUTO: 0 10E9/L (ref 0–0.2)
BASOPHILS NFR BLD AUTO: 0.4 %
DIFFERENTIAL METHOD BLD: ABNORMAL
EOSINOPHIL # BLD AUTO: 0 10E9/L (ref 0–0.7)
EOSINOPHIL NFR BLD AUTO: 0.4 %
ERYTHROCYTE [DISTWIDTH] IN BLOOD BY AUTOMATED COUNT: 12.6 % (ref 10–15)
HCT VFR BLD AUTO: 36.5 % (ref 35–47)
HGB BLD-MCNC: 12.2 G/DL (ref 11.7–15.7)
IMM GRANULOCYTES # BLD: 0 10E9/L (ref 0–0.4)
IMM GRANULOCYTES NFR BLD: 0 %
LYMPHOCYTES # BLD AUTO: 1.9 10E9/L (ref 0.8–5.3)
LYMPHOCYTES NFR BLD AUTO: 71.5 %
MCH RBC QN AUTO: 30.3 PG (ref 26.5–33)
MCHC RBC AUTO-ENTMCNC: 33.4 G/DL (ref 31.5–36.5)
MCV RBC AUTO: 91 FL (ref 78–100)
MONOCYTES # BLD AUTO: 0.2 10E9/L (ref 0–1.3)
MONOCYTES NFR BLD AUTO: 7.7 %
NEUTROPHILS # BLD AUTO: 0.5 10E9/L (ref 1.6–8.3)
NEUTROPHILS NFR BLD AUTO: 20 %
NRBC # BLD AUTO: 0 10*3/UL
NRBC BLD AUTO-RTO: 0 /100
PLATELET # BLD AUTO: 85 10E9/L (ref 150–450)
PLATELET # BLD EST: ABNORMAL 10*3/UL
RBC # BLD AUTO: 4.02 10E12/L (ref 3.8–5.2)
WBC # BLD AUTO: 2.6 10E9/L (ref 4–11)

## 2017-08-12 PROCEDURE — 85025 COMPLETE CBC W/AUTO DIFF WBC: CPT | Performed by: STUDENT IN AN ORGANIZED HEALTH CARE EDUCATION/TRAINING PROGRAM

## 2017-08-12 PROCEDURE — 25000132 ZZH RX MED GY IP 250 OP 250 PS 637: Performed by: STUDENT IN AN ORGANIZED HEALTH CARE EDUCATION/TRAINING PROGRAM

## 2017-08-12 PROCEDURE — 36415 COLL VENOUS BLD VENIPUNCTURE: CPT | Performed by: STUDENT IN AN ORGANIZED HEALTH CARE EDUCATION/TRAINING PROGRAM

## 2017-08-12 PROCEDURE — 99239 HOSP IP/OBS DSCHRG MGMT >30: CPT | Mod: GC | Performed by: INTERNAL MEDICINE

## 2017-08-12 RX ADMIN — ROPINIROLE HYDROCHLORIDE 0.25 MG: 0.25 TABLET, FILM COATED ORAL at 08:02

## 2017-08-12 RX ADMIN — Medication 250 MG: at 08:01

## 2017-08-12 NOTE — DISCHARGE SUMMARY
[facility] Discharge Summary    Kiera Macedo MRN# [id number]   Age: 61 year old YOB: 1955     Date of Admission:  8/8/2017  Date of Discharge::  8/12/2017  Admitting Physician:  Carlitos Thorpe MD  Discharge Physician:  Carlitos Thorpe MD (attending) and Jaqueline Corral MD (resident)     Home clinic: [facility]           Admission Diagnoses:   Fever [R50.9]          Discharge Diagnosis:   Neutropenia  Lymphopenia  Thrombocytopenia          Procedures:   - Bone Marrow Biopsy, pathology and flow cytometry pending     - Pelvic with transvaginal ultrasound (8/11):  IMPRESSION:   1.  The right adnexal lesion seen on prior CT is not well-visualized  on the current ultrasound. There is an area of homogeneous low level  internal echogenicity without internal vascularity, measuring  approximately 4 cm in the right adnexa seen on transabdominal but not  transvaginal imaging, suggestive of an endometrioma. Comparison with  prior outside imaging if available would be beneficial. If clinically  indicated, further evaluation could be obtained with a nonemergent  pelvic MRI.  2.  Normal-appearing uterus and left ovary.     - Thyroid ultrasound (8/11):  IMPRESSION:  Multinodular thyroid gland with bilateral benign colloid cysts. No  suspicious solid nodules are noted.     - CT Chest/Ab/Pelvis (8/10):  IMPRESSION:   1. 3.5 x 3.9 x 3.6 cm hypoattenuated right adnexal solid mass.  Consider pelvic MR/ultrasound.  2. Nonspecific mild posterior pleural nodular thickening.  3. 4 mm hypoattenuated hepatic dome foci, too small to characterize.  4. Multinodular thyroid gland. Recommend for thyroid ultrasound.  5. Nonobstructive left renal stone.          Medications Prior to Admission:     Prescriptions Prior to Admission   Medication Sig Dispense Refill Last Dose     AMITRIPTYLINE HCL PO Take 25 mg by mouth At Bedtime   8/7/2017 at Unknown time     ValACYclovir HCl (VALTREX PO) Take 250 mg by mouth daily   8/8/2017 at  Unknown time             Discharge Medications:   No medications were prescribed on discharge          Consultations:   Hematology/Oncology         Brief History of Illness:   Kiera Macedo is a 61 year old female with past medical history of 1 level spinal fusion who presented to the ED from  on 8/8 admitted for intermittent fevers and leukopenia. Had intermittent fevers with joint pain since May 2017. Had been seen in urgent care a few times over the previous 6 weeks with unremarkable labs (CBC, stool cxs, blood cxs, UA, SAMIR, ESR, CRP, babesia, CMP, lyme, anaplasma). Returned to urgent care on 8/8 with fever, they repeated tick-borne illness labs (lyme, babesia, anaplasma all negative), found to have neutropenia, lymphopenia and thrombocytopenia (, ) in setting of 102 degree fever. Upon admission was found to have ,  but was afebrile. Has history of previous symptoms and positive anaplasma titer (1:20) in 2016 which was treated with doxycycline.           Hospital Course:   #Fever   #Lymphopenia  #Absolute Neutropenia  #Thrombocytopenia  Was febrile at urgent care but afebrile during admission. Admitted with , , WBC 1.7K, . (8/9) labs: WBC 1.1K, , , .(8/10) labs: WBC 1.4K, , . HIV negative. TSH, lactic acid, ESR, CRP, LFTs normal. Lactic acid normal. Tick-borne panel negative per Care Everywhere. WBC improved to 2.1, , ALC 1400 on 8/11. Heme/Onc consulted; due to multiple workups for intermittent fevers from unknown etiology, heme/onc recommended full work up including viral panel (neg for Hep B, C, EBV, CMV), CT chest/ab/pelvis, and bone marrow biopsy. CT C/A/P showing 3.5 x 3.9 x 3.6 cm hypoattenuated right adnexal solid mass and multinodular thyroid gland. Thyroid US consistent with benign colloid cysts and no solid nodules. Pelvic US was non-specific but suggestive of endometrioma, recommending non-emergent MRI if  clinically concerned. Heme/Onc performed bone marrow biopsy with final results pending, although per note on 8/11, preliminary flow cytometry show no abnormal cells suggestive of leukemia or lymphoma. Dr. Sidhu of heme/onc has set up an appointment for follow-up on 8/24/17 and will follow-up regarding final results and repeat CBC. Patient was discharged home in stable condition.    Discharge Exam:  Vitals stable. Afebrile.  GEN: NAD, laying in bed, pleasant, non-toxic  HEENT: EOMI, no scleral icterus  CV: RRR, no M/R/G appreciated, normal S1/S2  PULM: CTAB, good effort, no increased work of breathing  ABD: Normal BS, non-tender, non-distended  EXT: No edema, ulcerations, large bandage present on right hip from bone marrow biopsy, no erythema or leakage.    NEURO: EOMI, appropriate mentation, no aphasia  SKIN: no rashes          Discharge Instructions and Follow-Up:   Discharge diet: Regular   Discharge activity: Activity as tolerated   Discharge follow-up: Follow up with Primary Care Physician in 1 week for hospital follow-up  Follow up with Dr. Sidhu in Hematology regarding 8/24/17 appointment with CBC and differential.           Discharge Disposition:   Discharged to home

## 2017-08-12 NOTE — PLAN OF CARE
Problem: Goal Outcome Summary  Goal: Goal Outcome Summary  Outcome: No Change  Assumed care of pt at 1900. VSS. Pt has mild c/o pain to hips relieved by PRN Tylenol. Slept well overnight. Tolerating regular diet well; denies N/V. Pt denies feeling dizzy. UOP adequate. +gas and BM. PIV SL. Up with SBA or ad mariah if not dizzy. PLAN: continue POC, possible D/C today.

## 2017-08-14 LAB
BACTERIA SPEC CULT: NO GROWTH
BACTERIA SPEC CULT: NO GROWTH
COPATH REPORT: NORMAL
MICRO REPORT STATUS: NORMAL
MICRO REPORT STATUS: NORMAL
SPECIMEN SOURCE: NORMAL
SPECIMEN SOURCE: NORMAL

## 2017-08-15 ENCOUNTER — CARE COORDINATION (OUTPATIENT)
Dept: CARE COORDINATION | Facility: CLINIC | Age: 62
End: 2017-08-15

## 2017-08-15 DIAGNOSIS — D70.9 NEUTROPENIA, UNSPECIFIED TYPE (H): ICD-10-CM

## 2017-08-15 DIAGNOSIS — D69.6 THROMBOCYTOPENIA (H): ICD-10-CM

## 2017-08-15 LAB
COPATH REPORT: NORMAL
DIFFERENTIAL METHOD BLD: ABNORMAL
ERYTHROCYTE [DISTWIDTH] IN BLOOD BY AUTOMATED COUNT: 11.7 % (ref 10–15)
HCT VFR BLD AUTO: 34.1 % (ref 35–47)
HGB BLD-MCNC: 11.6 G/DL (ref 11.7–15.7)
MCH RBC QN AUTO: 31 PG (ref 26.5–33)
MCHC RBC AUTO-ENTMCNC: 34 G/DL (ref 31.5–36.5)
MCV RBC AUTO: 91 FL (ref 78–100)
PLATELET # BLD AUTO: 165 10E9/L (ref 150–450)
RBC # BLD AUTO: 3.74 10E12/L (ref 3.8–5.2)
WBC # BLD AUTO: 4.7 10E9/L (ref 4–11)

## 2017-08-15 PROCEDURE — 36415 COLL VENOUS BLD VENIPUNCTURE: CPT | Performed by: FAMILY MEDICINE

## 2017-08-15 PROCEDURE — 85025 COMPLETE CBC W/AUTO DIFF WBC: CPT | Performed by: FAMILY MEDICINE

## 2017-08-15 NOTE — PROGRESS NOTES
"[facility]  \"Hello, my name is Meghna Lan , and I am calling from the [facility].  I want to check in and see how you are doing, after leaving the hospital.  You may also receive a call from your Care Coordinator (care team), but I want to make sure you don t have any urgent needs.  I have a couple questions to review with you:     Post-Discharge Outreach                                                    Kiera Macedo is a 61 year old female         Care Team:    Patient Care Team       Relationship Specialty Notifications Start End    Annette Gabriel PCP - General Nurse Practitioner  8/8/17     Phone: [phone] Fax: [phone]         [place] [id number]            Transition of Care Review                                                      Did you have a surgery or procedure during your hospital visit? No   If yes, do you have any of the following:     Signs of infection:  No:    Pain:  No         Wound/incision concerns? N/A    Do you have all of your medications/refills?  Yes    Are you having any side effects or questions about your medication(s)? No    Do you have any new or worsening symptoms?  No    Do you have any future appointments scheduled?   Yes 8/24/17 with Dr. Vanessa Benavides                                                      Thanks for your time.  Your Care Coordinator may follow-up within the next couple days.  In the meantime if you have questions, concerns or problems call your care team.        Meghna Lan    "

## 2017-08-19 ENCOUNTER — HEALTH MAINTENANCE LETTER (OUTPATIENT)
Age: 62
End: 2017-08-19

## 2017-08-24 ENCOUNTER — ONCOLOGY VISIT (OUTPATIENT)
Dept: ONCOLOGY | Facility: CLINIC | Age: 62
End: 2017-08-24
Attending: INTERNAL MEDICINE
Payer: COMMERCIAL

## 2017-08-24 ENCOUNTER — APPOINTMENT (OUTPATIENT)
Dept: LAB | Facility: CLINIC | Age: 62
End: 2017-08-24
Attending: INTERNAL MEDICINE
Payer: COMMERCIAL

## 2017-08-24 VITALS
DIASTOLIC BLOOD PRESSURE: 61 MMHG | WEIGHT: 133.7 LBS | TEMPERATURE: 98.6 F | SYSTOLIC BLOOD PRESSURE: 102 MMHG | RESPIRATION RATE: 16 BRPM | OXYGEN SATURATION: 97 % | HEART RATE: 95 BPM

## 2017-08-24 DIAGNOSIS — D70.9 NEUTROPENIA, UNSPECIFIED TYPE (H): ICD-10-CM

## 2017-08-24 DIAGNOSIS — D69.6 THROMBOCYTOPENIA (H): ICD-10-CM

## 2017-08-24 LAB
BASOPHILS # BLD AUTO: 0 10E9/L (ref 0–0.2)
BASOPHILS NFR BLD AUTO: 0.5 %
DIFFERENTIAL METHOD BLD: NORMAL
EOSINOPHIL # BLD AUTO: 0.2 10E9/L (ref 0–0.7)
EOSINOPHIL NFR BLD AUTO: 2.6 %
ERYTHROCYTE [DISTWIDTH] IN BLOOD BY AUTOMATED COUNT: 12.3 % (ref 10–15)
HCT VFR BLD AUTO: 36.8 % (ref 35–47)
HGB BLD-MCNC: 12.4 G/DL (ref 11.7–15.7)
IMM GRANULOCYTES # BLD: 0 10E9/L (ref 0–0.4)
IMM GRANULOCYTES NFR BLD: 0.2 %
LYMPHOCYTES # BLD AUTO: 2.5 10E9/L (ref 0.8–5.3)
LYMPHOCYTES NFR BLD AUTO: 40.4 %
MCH RBC QN AUTO: 30.2 PG (ref 26.5–33)
MCHC RBC AUTO-ENTMCNC: 33.7 G/DL (ref 31.5–36.5)
MCV RBC AUTO: 90 FL (ref 78–100)
MONOCYTES # BLD AUTO: 0.4 10E9/L (ref 0–1.3)
MONOCYTES NFR BLD AUTO: 6.5 %
NEUTROPHILS # BLD AUTO: 3.1 10E9/L (ref 1.6–8.3)
NEUTROPHILS NFR BLD AUTO: 49.8 %
NRBC # BLD AUTO: 0 10*3/UL
NRBC BLD AUTO-RTO: 0 /100
PLATELET # BLD AUTO: 273 10E9/L (ref 150–450)
RBC # BLD AUTO: 4.11 10E12/L (ref 3.8–5.2)
WBC # BLD AUTO: 6.2 10E9/L (ref 4–11)

## 2017-08-24 PROCEDURE — 36415 COLL VENOUS BLD VENIPUNCTURE: CPT

## 2017-08-24 PROCEDURE — 99213 OFFICE O/P EST LOW 20 MIN: CPT | Mod: ZP | Performed by: INTERNAL MEDICINE

## 2017-08-24 PROCEDURE — 85025 COMPLETE CBC W/AUTO DIFF WBC: CPT | Performed by: INTERNAL MEDICINE

## 2017-08-24 PROCEDURE — 99212 OFFICE O/P EST SF 10 MIN: CPT | Mod: ZF

## 2017-08-24 ASSESSMENT — PAIN SCALES - GENERAL: PAINLEVEL: NO PAIN (0)

## 2017-08-24 NOTE — MR AVS SNAPSHOT
After Visit Summary   8/24/2017    Kiera Macedo    MRN: [id number]           Patient Information     Date Of Birth          1955        Visit Information        Provider Department      8/24/2017 12:00 PM Magali Sidhu MD [facility]        Today's Diagnoses     Neutropenia, unspecified type (H)        Thrombocytopenia (H)           Follow-ups after your visit        Follow-up notes from your care team     Return if symptoms worsen or fail to improve.      Who to contact     If you have questions or need follow up information about today's clinic visit or your schedule please contact [facility] directly at [phone].  Normal or non-critical lab and imaging results will be communicated to you by MyChart, letter or phone within 4 business days after the clinic has received the results. If you do not hear from us within 7 days, please contact the clinic through [employer] or phone. If you have a critical or abnormal lab result, we will notify you by phone as soon as possible.  Submit refill requests through [employer] or call your pharmacy and they will forward the refill request to us. Please allow 3 business days for your refill to be completed.          Additional Information About Your Visit        MyChart Information     [employer] gives you secure access to your electronic health record. If you see a primary care provider, you can also send messages to your care team and make appointments. If you have questions, please call your primary care clinic.  If you do not have a primary care provider, please call [phone] and they will assist you.        Care EveryWhere ID     This is your Care EveryWhere ID. This could be used by other organizations to access your [place] medical records  [id number]        Your Vitals Were     Pulse Temperature Respirations Pulse Oximetry          95 98.6  F (37  C) 16 97%         Blood Pressure from Last 3 Encounters:    08/24/17 102/61   08/12/17 99/67    Weight from Last 3 Encounters:   08/24/17 60.6 kg (133 lb 11.2 oz)   08/10/17 62 kg (136 lb 9.6 oz)              We Performed the Following     CBC with platelets differential        Primary Care Provider Office Phone # Fax #    Annette Gabriel [phone]       [place] [id number] [facility] [address]        Equal Access to Services     MONA WISDOM : Hadii aad ku hadasho Soomaali, waaxda luqadaha, qaybta kaalmada adeegyada, waxay idiin hayaan adeeg kharash la'marjn . So [facility] [phone].    ATENCIÓN: Si habla español, tiene a sidhu disposición servicios gratuitos de asistencia lingüística. [facility] [phone].    We comply with applicable federal civil rights laws and [place] laws. We do not discriminate on the basis of race, color, national origin, age, disability sex, sexual orientation or gender identity.            Thank you!     Thank you for choosing [facility]  for your care. Our goal is always to provide you with excellent care. Hearing back from our patients is one way we can continue to improve our services. Please take a few minutes to complete the written survey that you may receive in the mail after your visit with us. Thank you!             Your Updated Medication List - Protect others around you: Learn how to safely use, store and throw away your medicines at www.disposemymeds.org.          This list is accurate as of: 8/24/17 12:14 PM.  Always use your most recent med list.                   Brand Name Dispense Instructions for use Diagnosis    AMITRIPTYLINE HCL PO      Take 25 mg by mouth At Bedtime        VALTREX PO      Take 250 mg by mouth daily

## 2017-08-24 NOTE — PROGRESS NOTES
Problem list:  1. Fever and pancytopenia August 2017 of unclear etiology, resolving  2. Status post C-spine fusion  3. Herpes flares, on chronic suppressive Valtrex  4. Restless legs, on amitriptyline    Interim history: Ms. Jones is a 61-year-old woman who was hospitalized for a few days earlier this month due to unexplained fever and pancytopenia. She underwent bone marrow biopsy and aspirate on 8/11/17 that was normal size liver with trilineage hematopoiesis and overall unremarkable. Her fevers did not recur and a follow-up CBC (no differential) on 8/11/17 that was normal.     Today she has a slight cold with sinus congestion and runny nose. No high fever, cough, wheezing, or sore throat. She is not having any body aches and is not concerned about the symptoms she is having. Her bone marrow biopsy site is still slightly sore.    Review of Systems:  As in history above. The rest of the >10 point ROS is negative.      PHYSICAL EXAMINATION:  /61  Pulse 95  Temp 98.6  F (37  C)  Resp 16  Wt 60.6 kg (133 lb 11.2 oz)  SpO2 97%    General appearance:  Patient is 62 yo woman in no acute distress, occasionally sniffling     HEENT:  No pallor, icterus, or mucositis.  No thyromegaly.   Lymph nodes:  No cervical, supraclavicular, axillary, or inguinal lymphadenopathy.   Lungs:  Clear to auscultation bilaterally.   Heart:  Regular rate and rhythm; no S3 S4 or murmer.     Abdomen:  Positive bowel sounds, soft and nontender, nondistended.  No hepatomegaly. No splenomegaly appreciated.    Extremities:  No joint swelling or tenderness.  No ankle edema.     Skin:  No rash, no petechiae or ecchymoses.    Labs:    Results for AJ JONES (MRN [id number]) as of 8/24/2017 12:09   Ref. Range 8/24/2017 11:17   WBC Latest Ref Range: 4.0 - 11.0 10e9/L 6.2   Hemoglobin Latest Ref Range: 11.7 - 15.7 g/dL 12.4   Hematocrit Latest Ref Range: 35.0 - 47.0 % 36.8   Platelet Count Latest Ref Range: 150 - 450 10e9/L 273   RBC  Count Latest Ref Range: 3.8 - 5.2 10e12/L 4.11   MCV Latest Ref Range: 78 - 100 fl 90   MCH Latest Ref Range: 26.5 - 33.0 pg 30.2   MCHC Latest Ref Range: 31.5 - 36.5 g/dL 33.7   RDW Latest Ref Range: 10.0 - 15.0 % 12.3   Diff Method Unknown Automated Method   % Neutrophils Latest Units: % 49.8   % Lymphocytes Latest Units: % 40.4   % Monocytes Latest Units: % 6.5   % Eosinophils Latest Units: % 2.6   % Basophils Latest Units: % 0.5   % Immature Granulocytes Latest Units: % 0.2   Nucleated RBCs Latest Ref Range: 0 /100 0   Absolute Neutrophil Latest Ref Range: 1.6 - 8.3 10e9/L 3.1   Absolute Lymphocytes Latest Ref Range: 0.8 - 5.3 10e9/L 2.5   Absolute Monocytes Latest Ref Range: 0.0 - 1.3 10e9/L 0.4   Absolute Eosinophils Latest Ref Range: 0.0 - 0.7 10e9/L 0.2   Absolute Basophils Latest Ref Range: 0.0 - 0.2 10e9/L 0.0   Abs Immature Granulocytes Latest Ref Range: 0 - 0.4 10e9/L 0.0     Assessment and plan:  61-year-old woman with pancytopenia during febrile episode that is now resolved. I discussed with her that I suspect she had some sort of viral infection and that we could not pinpoint. Fortunately was self-limited. She had the extensive evaluation because of her previous symptoms with fever and the possible diagnosis of anaplasmosis. Fortunately this did not end up being anything serious. In the future and recommend that she not go to urgent care for fever unless there are other symptoms that go along with this such as severe cough wheezing, shortness of breath, diarrhea, near passing out. And I emphasized that I don't want her to avoid going to seek medical attention if she needs it however she can use some judgment and see if the symptoms are not resolving before going in. I emphasized the positives that her complete blood count and differential is gone back completely to normal. She does not need routine follow-up in hematology clinic and does not need routine complete blood counts.  Magali Sidhu,  MD  Hematology

## 2017-08-24 NOTE — NURSING NOTE
Oncology Rooming Note    August 24, 2017 11:46 AM   Kiera Macedo is a 61 year old female who presents for:    Chief Complaint   Patient presents with     Labs Only     venipuncture, vitals checked     Oncology Clinic Visit     Thrombocytopenia, hosp f\u     Initial Vitals: /61  Pulse 95  Temp 98.6  F (37  C)  Resp 16  Wt 60.6 kg (133 lb 11.2 oz)  SpO2 97% There is no height or weight on file to calculate BMI. There is no height or weight on file to calculate BSA.  No Pain (0) Comment: Data Unavailable   No LMP recorded.  Allergies reviewed: Yes  Medications reviewed: Yes    Medications: Medication refills not needed today.  Pharmacy name entered into EPIC: Data Unavailable    Clinical concerns: What caused the Low WBC ? Thea was notified.    5  minutes for nursing intake (face to face time)     Nidia Booth MA

## 2017-08-24 NOTE — LETTER
8/24/2017       RE: Kiera Jones  [address]  [place] [address]     Dear Colleague,    Thank you for referring your patient, Kiera Jones, to [facility]. Please see a copy of my visit note below.    Problem list:  1. Fever and pancytopenia August 2017 of unclear etiology, resolving  2. Status post C-spine fusion  3. Herpes flares, on chronic suppressive Valtrex  4. Restless legs, on amitriptyline    Interim history: Ms. Jones is a 61-year-old woman who was hospitalized for a few days earlier this month due to unexplained fever and pancytopenia. She underwent bone marrow biopsy and aspirate on 8/11/17 that was normal size liver with trilineage hematopoiesis and overall unremarkable. Her fevers did not recur and a follow-up CBC (no differential) on 8/11/17 that was normal.     Today she has a slight cold with sinus congestion and runny nose. No high fever, cough, wheezing, or sore throat. She is not having any body aches and is not concerned about the symptoms she is having. Her bone marrow biopsy site is still slightly sore.    Review of Systems:  As in history above. The rest of the >10 point ROS is negative.      PHYSICAL EXAMINATION:  /61  Pulse 95  Temp 98.6  F (37  C)  Resp 16  Wt 60.6 kg (133 lb 11.2 oz)  SpO2 97%    General appearance:  Patient is 62 yo woman in no acute distress, occasionally sniffling     HEENT:  No pallor, icterus, or mucositis.  No thyromegaly.   Lymph nodes:  No cervical, supraclavicular, axillary, or inguinal lymphadenopathy.   Lungs:  Clear to auscultation bilaterally.   Heart:  Regular rate and rhythm; no S3 S4 or murmer.     Abdomen:  Positive bowel sounds, soft and nontender, nondistended.  No hepatomegaly. No splenomegaly appreciated.    Extremities:  No joint swelling or tenderness.  No ankle edema.     Skin:  No rash, no petechiae or ecchymoses.    Labs:    Results for KIERA JONES (MRN [id number]) as of 8/24/2017 12:09    Ref. Range 8/24/2017 11:17   WBC Latest Ref Range: 4.0 - 11.0 10e9/L 6.2   Hemoglobin Latest Ref Range: 11.7 - 15.7 g/dL 12.4   Hematocrit Latest Ref Range: 35.0 - 47.0 % 36.8   Platelet Count Latest Ref Range: 150 - 450 10e9/L 273   RBC Count Latest Ref Range: 3.8 - 5.2 10e12/L 4.11   MCV Latest Ref Range: 78 - 100 fl 90   MCH Latest Ref Range: 26.5 - 33.0 pg 30.2   MCHC Latest Ref Range: 31.5 - 36.5 g/dL 33.7   RDW Latest Ref Range: 10.0 - 15.0 % 12.3   Diff Method Unknown Automated Method   % Neutrophils Latest Units: % 49.8   % Lymphocytes Latest Units: % 40.4   % Monocytes Latest Units: % 6.5   % Eosinophils Latest Units: % 2.6   % Basophils Latest Units: % 0.5   % Immature Granulocytes Latest Units: % 0.2   Nucleated RBCs Latest Ref Range: 0 /100 0   Absolute Neutrophil Latest Ref Range: 1.6 - 8.3 10e9/L 3.1   Absolute Lymphocytes Latest Ref Range: 0.8 - 5.3 10e9/L 2.5   Absolute Monocytes Latest Ref Range: 0.0 - 1.3 10e9/L 0.4   Absolute Eosinophils Latest Ref Range: 0.0 - 0.7 10e9/L 0.2   Absolute Basophils Latest Ref Range: 0.0 - 0.2 10e9/L 0.0   Abs Immature Granulocytes Latest Ref Range: 0 - 0.4 10e9/L 0.0     Assessment and plan:  61-year-old woman with pancytopenia during febrile episode that is now resolved. I discussed with her that I suspect she had some sort of viral infection and that we could not pinpoint. Fortunately was self-limited. She had the extensive evaluation because of her previous symptoms with fever and the possible diagnosis of anaplasmosis. Fortunately this did not end up being anything serious. In the future and recommend that she not go to urgent care for fever unless there are other symptoms that go along with this such as severe cough wheezing, shortness of breath, diarrhea, near passing out. And I emphasized that I don't want her to avoid going to seek medical attention if she needs it however she can use some judgment and see if the symptoms are not resolving before going in. I  emphasized the positives that her complete blood count and differential is gone back completely to normal. She does not need routine follow-up in hematology clinic and does not need routine complete blood counts.  Magali Sidhu MD  Hematology

## 2017-08-31 LAB — COPATH REPORT: NORMAL

## 2017-09-06 LAB
CMV DNA SPEC QL NAA+PROBE: NOT DETECTED
SPECIMEN SOURCE: NORMAL

## 2017-09-08 LAB
BACTERIA SPEC CULT: NORMAL
Lab: NORMAL
SPECIMEN SOURCE: NORMAL

## 2017-09-14 ENCOUNTER — TRANSFERRED RECORDS (OUTPATIENT)
Dept: HEALTH INFORMATION MANAGEMENT | Facility: CLINIC | Age: 62
End: 2017-09-14

## 2017-09-20 LAB
EBV DNA SPEC QL NAA+PROBE: NOT DETECTED
SPECIMEN SOURCE: NORMAL

## 2017-09-22 LAB
MYCOBACTERIUM SPEC CULT: NORMAL
SPECIMEN SOURCE: NORMAL

## 2017-09-27 ENCOUNTER — TRANSFERRED RECORDS (OUTPATIENT)
Dept: HEALTH INFORMATION MANAGEMENT | Facility: CLINIC | Age: 62
End: 2017-09-27

## 2017-10-03 ENCOUNTER — TRANSFERRED RECORDS (OUTPATIENT)
Dept: HEALTH INFORMATION MANAGEMENT | Facility: CLINIC | Age: 62
End: 2017-10-03

## 2017-10-03 PROBLEM — R50.9 FEVER: Status: RESOLVED | Noted: 2017-08-08 | Resolved: 2017-10-03

## 2017-10-03 PROBLEM — D70.9 NEUTROPENIA (H): Status: RESOLVED | Noted: 2017-08-10 | Resolved: 2017-10-03

## 2017-10-03 PROBLEM — R19.00 PELVIC MASS: Status: ACTIVE | Noted: 2017-10-03

## 2017-11-01 ENCOUNTER — TRANSFERRED RECORDS (OUTPATIENT)
Dept: HEALTH INFORMATION MANAGEMENT | Facility: CLINIC | Age: 62
End: 2017-11-01

## 2017-11-13 ENCOUNTER — HOSPITAL ENCOUNTER (OUTPATIENT)
Facility: CLINIC | Age: 62
Discharge: HOME | End: 2017-11-13
Attending: OBSTETRICS & GYNECOLOGY | Admitting: OBSTETRICS & GYNECOLOGY
Payer: COMMERCIAL

## 2017-11-13 ENCOUNTER — ANESTHESIA EVENT (OUTPATIENT)
Dept: SURGERY | Facility: CLINIC | Age: 62
End: 2017-11-13
Payer: COMMERCIAL

## 2017-11-13 ENCOUNTER — ANESTHESIA (OUTPATIENT)
Dept: SURGERY | Facility: CLINIC | Age: 62
End: 2017-11-13
Payer: COMMERCIAL

## 2017-11-13 VITALS
DIASTOLIC BLOOD PRESSURE: 67 MMHG | WEIGHT: 137.8 LBS | HEIGHT: 63 IN | RESPIRATION RATE: 16 BRPM | OXYGEN SATURATION: 98 % | SYSTOLIC BLOOD PRESSURE: 111 MMHG | TEMPERATURE: 97.6 F | BODY MASS INDEX: 24.41 KG/M2

## 2017-11-13 DIAGNOSIS — R19.00 PELVIC MASS: Primary | ICD-10-CM

## 2017-11-13 PROCEDURE — 25000128 H RX IP 250 OP 636: Performed by: NURSE PRACTITIONER

## 2017-11-13 PROCEDURE — 36000087 ZZH SURGERY LEVEL 8 EA 15 ADDTL MIN: Performed by: OBSTETRICS & GYNECOLOGY

## 2017-11-13 PROCEDURE — 88331 PATH CONSLTJ SURG 1 BLK 1SPC: CPT | Performed by: OBSTETRICS & GYNECOLOGY

## 2017-11-13 PROCEDURE — 25000132 ZZH RX MED GY IP 250 OP 250 PS 637: Performed by: OBSTETRICS & GYNECOLOGY

## 2017-11-13 PROCEDURE — 25000125 ZZHC RX 250: Performed by: OBSTETRICS & GYNECOLOGY

## 2017-11-13 PROCEDURE — 25000566 ZZH SEVOFLURANE, EA 15 MIN: Performed by: OBSTETRICS & GYNECOLOGY

## 2017-11-13 PROCEDURE — 88307 TISSUE EXAM BY PATHOLOGIST: CPT | Performed by: OBSTETRICS & GYNECOLOGY

## 2017-11-13 PROCEDURE — 25000128 H RX IP 250 OP 636: Performed by: NURSE ANESTHETIST, CERTIFIED REGISTERED

## 2017-11-13 PROCEDURE — 25000128 H RX IP 250 OP 636: Performed by: OBSTETRICS & GYNECOLOGY

## 2017-11-13 PROCEDURE — S0077 INJECTION, CLINDAMYCIN PHOSP: HCPCS | Performed by: OBSTETRICS & GYNECOLOGY

## 2017-11-13 PROCEDURE — 27210995 ZZH RX 272: Performed by: OBSTETRICS & GYNECOLOGY

## 2017-11-13 PROCEDURE — 71000027 ZZH RECOVERY PHASE 2 EACH 15 MINS: Performed by: OBSTETRICS & GYNECOLOGY

## 2017-11-13 PROCEDURE — 71000013 ZZH RECOVERY PHASE 1 LEVEL 1 EA ADDTL HR: Performed by: OBSTETRICS & GYNECOLOGY

## 2017-11-13 PROCEDURE — 25000125 ZZHC RX 250: Performed by: NURSE ANESTHETIST, CERTIFIED REGISTERED

## 2017-11-13 PROCEDURE — 88307 TISSUE EXAM BY PATHOLOGIST: CPT | Mod: 26 | Performed by: OBSTETRICS & GYNECOLOGY

## 2017-11-13 PROCEDURE — 88331 PATH CONSLTJ SURG 1 BLK 1SPC: CPT | Mod: 26 | Performed by: OBSTETRICS & GYNECOLOGY

## 2017-11-13 PROCEDURE — 40000170 ZZH STATISTIC PRE-PROCEDURE ASSESSMENT II: Performed by: OBSTETRICS & GYNECOLOGY

## 2017-11-13 PROCEDURE — 25000128 H RX IP 250 OP 636: Performed by: ANESTHESIOLOGY

## 2017-11-13 PROCEDURE — 37000008 ZZH ANESTHESIA TECHNICAL FEE, 1ST 30 MIN: Performed by: OBSTETRICS & GYNECOLOGY

## 2017-11-13 PROCEDURE — 27210794 ZZH OR GENERAL SUPPLY STERILE: Performed by: OBSTETRICS & GYNECOLOGY

## 2017-11-13 PROCEDURE — 71000012 ZZH RECOVERY PHASE 1 LEVEL 1 FIRST HR: Performed by: OBSTETRICS & GYNECOLOGY

## 2017-11-13 PROCEDURE — 25800025 ZZH RX 258: Performed by: OBSTETRICS & GYNECOLOGY

## 2017-11-13 PROCEDURE — 37000009 ZZH ANESTHESIA TECHNICAL FEE, EACH ADDTL 15 MIN: Performed by: OBSTETRICS & GYNECOLOGY

## 2017-11-13 PROCEDURE — 25000132 ZZH RX MED GY IP 250 OP 250 PS 637: Performed by: NURSE PRACTITIONER

## 2017-11-13 PROCEDURE — 36000085 ZZH SURGERY LEVEL 8 1ST 30 MIN: Performed by: OBSTETRICS & GYNECOLOGY

## 2017-11-13 RX ORDER — LIDOCAINE HYDROCHLORIDE 20 MG/ML
INJECTION, SOLUTION INFILTRATION; PERINEURAL PRN
Status: DISCONTINUED | OUTPATIENT
Start: 2017-11-13 | End: 2017-11-13

## 2017-11-13 RX ORDER — ONDANSETRON 4 MG/1
4 TABLET, ORALLY DISINTEGRATING ORAL EVERY 30 MIN PRN
Status: DISCONTINUED | OUTPATIENT
Start: 2017-11-13 | End: 2017-11-13 | Stop reason: HOSPADM

## 2017-11-13 RX ORDER — BUPIVACAINE HYDROCHLORIDE AND EPINEPHRINE 5; 5 MG/ML; UG/ML
INJECTION, SOLUTION PERINEURAL PRN
Status: DISCONTINUED | OUTPATIENT
Start: 2017-11-13 | End: 2017-11-13 | Stop reason: HOSPADM

## 2017-11-13 RX ORDER — SENNOSIDES A AND B 8.6 MG/1
1-3 TABLET, FILM COATED ORAL 2 TIMES DAILY PRN
Qty: 30 TABLET | Refills: 0 | Status: SHIPPED | OUTPATIENT
Start: 2017-11-13

## 2017-11-13 RX ORDER — PROPOFOL 10 MG/ML
INJECTION, EMULSION INTRAVENOUS PRN
Status: DISCONTINUED | OUTPATIENT
Start: 2017-11-13 | End: 2017-11-13

## 2017-11-13 RX ORDER — FENTANYL CITRATE 50 UG/ML
25-50 INJECTION, SOLUTION INTRAMUSCULAR; INTRAVENOUS
Status: DISCONTINUED | OUTPATIENT
Start: 2017-11-13 | End: 2017-11-13 | Stop reason: HOSPADM

## 2017-11-13 RX ORDER — SODIUM CHLORIDE, SODIUM LACTATE, POTASSIUM CHLORIDE, CALCIUM CHLORIDE 600; 310; 30; 20 MG/100ML; MG/100ML; MG/100ML; MG/100ML
INJECTION, SOLUTION INTRAVENOUS CONTINUOUS
Status: DISCONTINUED | OUTPATIENT
Start: 2017-11-13 | End: 2017-11-13 | Stop reason: HOSPADM

## 2017-11-13 RX ORDER — GLYCOPYRROLATE 0.2 MG/ML
INJECTION, SOLUTION INTRAMUSCULAR; INTRAVENOUS PRN
Status: DISCONTINUED | OUTPATIENT
Start: 2017-11-13 | End: 2017-11-13

## 2017-11-13 RX ORDER — HYDROCODONE BITARTRATE AND ACETAMINOPHEN 5; 325 MG/1; MG/1
1-2 TABLET ORAL EVERY 4 HOURS PRN
Qty: 30 TABLET | Refills: 0 | Status: SHIPPED | OUTPATIENT
Start: 2017-11-13

## 2017-11-13 RX ORDER — SODIUM CHLORIDE, SODIUM LACTATE, POTASSIUM CHLORIDE, CALCIUM CHLORIDE 600; 310; 30; 20 MG/100ML; MG/100ML; MG/100ML; MG/100ML
INJECTION, SOLUTION INTRAVENOUS CONTINUOUS PRN
Status: DISCONTINUED | OUTPATIENT
Start: 2017-11-13 | End: 2017-11-13

## 2017-11-13 RX ORDER — GENTAMICIN SULFATE 100 MG/100ML
1.7 INJECTION, SOLUTION INTRAVENOUS SEE ADMIN INSTRUCTIONS
Status: DISCONTINUED | OUTPATIENT
Start: 2017-11-13 | End: 2017-11-13 | Stop reason: HOSPADM

## 2017-11-13 RX ORDER — FENTANYL CITRATE 50 UG/ML
INJECTION, SOLUTION INTRAMUSCULAR; INTRAVENOUS PRN
Status: DISCONTINUED | OUTPATIENT
Start: 2017-11-13 | End: 2017-11-13

## 2017-11-13 RX ORDER — HYDROCODONE BITARTRATE AND ACETAMINOPHEN 5; 325 MG/1; MG/1
1 TABLET ORAL ONCE
Status: CANCELLED | OUTPATIENT
Start: 2017-11-13 | End: 2017-11-13

## 2017-11-13 RX ORDER — HYDROMORPHONE HYDROCHLORIDE 1 MG/ML
.3-.5 INJECTION, SOLUTION INTRAMUSCULAR; INTRAVENOUS; SUBCUTANEOUS EVERY 10 MIN PRN
Status: DISCONTINUED | OUTPATIENT
Start: 2017-11-13 | End: 2017-11-13 | Stop reason: HOSPADM

## 2017-11-13 RX ORDER — ONDANSETRON 4 MG/1
4 TABLET, ORALLY DISINTEGRATING ORAL
Status: DISCONTINUED | OUTPATIENT
Start: 2017-11-13 | End: 2017-11-14 | Stop reason: HOSPADM

## 2017-11-13 RX ORDER — MAGNESIUM HYDROXIDE 1200 MG/15ML
LIQUID ORAL PRN
Status: DISCONTINUED | OUTPATIENT
Start: 2017-11-13 | End: 2017-11-13 | Stop reason: HOSPADM

## 2017-11-13 RX ORDER — CLINDAMYCIN PHOSPHATE 900 MG/50ML
900 INJECTION, SOLUTION INTRAVENOUS SEE ADMIN INSTRUCTIONS
Status: DISCONTINUED | OUTPATIENT
Start: 2017-11-13 | End: 2017-11-13 | Stop reason: HOSPADM

## 2017-11-13 RX ORDER — HYDROCODONE BITARTRATE AND ACETAMINOPHEN 5; 325 MG/1; MG/1
1-2 TABLET ORAL
Status: COMPLETED | OUTPATIENT
Start: 2017-11-13 | End: 2017-11-13

## 2017-11-13 RX ORDER — MEPERIDINE HYDROCHLORIDE 25 MG/ML
12.5 INJECTION INTRAMUSCULAR; INTRAVENOUS; SUBCUTANEOUS
Status: DISCONTINUED | OUTPATIENT
Start: 2017-11-13 | End: 2017-11-13 | Stop reason: HOSPADM

## 2017-11-13 RX ORDER — PROPOFOL 10 MG/ML
INJECTION, EMULSION INTRAVENOUS CONTINUOUS PRN
Status: DISCONTINUED | OUTPATIENT
Start: 2017-11-13 | End: 2017-11-13

## 2017-11-13 RX ORDER — IBUPROFEN 600 MG/1
600 TABLET, FILM COATED ORAL EVERY 6 HOURS PRN
Qty: 40 TABLET | Refills: 1 | Status: SHIPPED | OUTPATIENT
Start: 2017-11-13

## 2017-11-13 RX ORDER — IBUPROFEN 600 MG/1
600 TABLET, FILM COATED ORAL
Status: DISCONTINUED | OUTPATIENT
Start: 2017-11-13 | End: 2017-11-14 | Stop reason: HOSPADM

## 2017-11-13 RX ORDER — ONDANSETRON 2 MG/ML
4 INJECTION INTRAMUSCULAR; INTRAVENOUS EVERY 30 MIN PRN
Status: DISCONTINUED | OUTPATIENT
Start: 2017-11-13 | End: 2017-11-13 | Stop reason: HOSPADM

## 2017-11-13 RX ORDER — KETOROLAC TROMETHAMINE 30 MG/ML
30 INJECTION, SOLUTION INTRAMUSCULAR; INTRAVENOUS ONCE
Status: COMPLETED | OUTPATIENT
Start: 2017-11-13 | End: 2017-11-13

## 2017-11-13 RX ORDER — HYDROCODONE BITARTRATE AND ACETAMINOPHEN 5; 325 MG/1; MG/1
1 TABLET ORAL ONCE
Status: COMPLETED | OUTPATIENT
Start: 2017-11-13 | End: 2017-11-13

## 2017-11-13 RX ORDER — CLINDAMYCIN PHOSPHATE 900 MG/50ML
900 INJECTION, SOLUTION INTRAVENOUS
Status: COMPLETED | OUTPATIENT
Start: 2017-11-13 | End: 2017-11-13

## 2017-11-13 RX ORDER — FENTANYL CITRATE 50 UG/ML
25-50 INJECTION, SOLUTION INTRAMUSCULAR; INTRAVENOUS EVERY 5 MIN PRN
Status: DISCONTINUED | OUTPATIENT
Start: 2017-11-13 | End: 2017-11-13 | Stop reason: HOSPADM

## 2017-11-13 RX ORDER — GENTAMICIN SULFATE 60 MG/50ML
2 INJECTION, SOLUTION INTRAVENOUS
Status: COMPLETED | OUTPATIENT
Start: 2017-11-13 | End: 2017-11-13

## 2017-11-13 RX ORDER — NEOSTIGMINE METHYLSULFATE 1 MG/ML
VIAL (ML) INJECTION PRN
Status: DISCONTINUED | OUTPATIENT
Start: 2017-11-13 | End: 2017-11-13

## 2017-11-13 RX ORDER — FENTANYL CITRATE 50 UG/ML
50-100 INJECTION, SOLUTION INTRAMUSCULAR; INTRAVENOUS
Status: DISCONTINUED | OUTPATIENT
Start: 2017-11-13 | End: 2017-11-13 | Stop reason: HOSPADM

## 2017-11-13 RX ORDER — DEXAMETHASONE SODIUM PHOSPHATE 4 MG/ML
INJECTION, SOLUTION INTRA-ARTICULAR; INTRALESIONAL; INTRAMUSCULAR; INTRAVENOUS; SOFT TISSUE PRN
Status: DISCONTINUED | OUTPATIENT
Start: 2017-11-13 | End: 2017-11-13

## 2017-11-13 RX ORDER — NALOXONE HYDROCHLORIDE 0.4 MG/ML
.1-.4 INJECTION, SOLUTION INTRAMUSCULAR; INTRAVENOUS; SUBCUTANEOUS
Status: DISCONTINUED | OUTPATIENT
Start: 2017-11-13 | End: 2017-11-13 | Stop reason: HOSPADM

## 2017-11-13 RX ORDER — ONDANSETRON 2 MG/ML
INJECTION INTRAMUSCULAR; INTRAVENOUS PRN
Status: DISCONTINUED | OUTPATIENT
Start: 2017-11-13 | End: 2017-11-13

## 2017-11-13 RX ADMIN — DEXAMETHASONE SODIUM PHOSPHATE 4 MG: 4 INJECTION, SOLUTION INTRA-ARTICULAR; INTRALESIONAL; INTRAMUSCULAR; INTRAVENOUS; SOFT TISSUE at 10:45

## 2017-11-13 RX ADMIN — GLYCOPYRROLATE 0.4 MG: 0.2 INJECTION, SOLUTION INTRAMUSCULAR; INTRAVENOUS at 11:53

## 2017-11-13 RX ADMIN — MIDAZOLAM HYDROCHLORIDE 2 MG: 1 INJECTION, SOLUTION INTRAMUSCULAR; INTRAVENOUS at 10:24

## 2017-11-13 RX ADMIN — PROPOFOL 100 MG: 10 INJECTION, EMULSION INTRAVENOUS at 10:27

## 2017-11-13 RX ADMIN — KETOROLAC TROMETHAMINE 30 MG: 30 INJECTION, SOLUTION INTRAMUSCULAR at 12:32

## 2017-11-13 RX ADMIN — CISATRACURIUM BESYLATE 2 MG: 2 INJECTION INTRAVENOUS at 11:01

## 2017-11-13 RX ADMIN — GENTAMICIN SULFATE 120 MG: 60 INJECTION, SOLUTION INTRAVENOUS at 10:33

## 2017-11-13 RX ADMIN — HYDROMORPHONE HYDROCHLORIDE 0.5 MG: 1 INJECTION, SOLUTION INTRAMUSCULAR; INTRAVENOUS; SUBCUTANEOUS at 12:54

## 2017-11-13 RX ADMIN — FENTANYL CITRATE 50 MCG: 50 INJECTION INTRAMUSCULAR; INTRAVENOUS at 12:35

## 2017-11-13 RX ADMIN — FENTANYL CITRATE 100 MCG: 50 INJECTION, SOLUTION INTRAMUSCULAR; INTRAVENOUS at 10:27

## 2017-11-13 RX ADMIN — LIDOCAINE HYDROCHLORIDE 40 MG: 20 INJECTION, SOLUTION INFILTRATION; PERINEURAL at 10:27

## 2017-11-13 RX ADMIN — HYDROCODONE BITARTRATE AND ACETAMINOPHEN 1 TABLET: 5; 325 TABLET ORAL at 15:44

## 2017-11-13 RX ADMIN — SODIUM CHLORIDE, POTASSIUM CHLORIDE, SODIUM LACTATE AND CALCIUM CHLORIDE: 600; 310; 30; 20 INJECTION, SOLUTION INTRAVENOUS at 11:42

## 2017-11-13 RX ADMIN — HYDROMORPHONE HYDROCHLORIDE 0.5 MG: 1 INJECTION, SOLUTION INTRAMUSCULAR; INTRAVENOUS; SUBCUTANEOUS at 11:12

## 2017-11-13 RX ADMIN — ROCURONIUM BROMIDE 50 MG: 10 INJECTION INTRAVENOUS at 10:27

## 2017-11-13 RX ADMIN — PROPOFOL 75 MCG/KG/MIN: 10 INJECTION, EMULSION INTRAVENOUS at 10:33

## 2017-11-13 RX ADMIN — NEOSTIGMINE METHYLSULFATE 3 MG: 1 INJECTION INTRAMUSCULAR; INTRAVENOUS; SUBCUTANEOUS at 11:53

## 2017-11-13 RX ADMIN — CLINDAMYCIN PHOSPHATE 900 MG: 18 INJECTION, SOLUTION INTRAVENOUS at 10:24

## 2017-11-13 RX ADMIN — HYDROCODONE BITARTRATE AND ACETAMINOPHEN 1 TABLET: 5; 325 TABLET ORAL at 13:49

## 2017-11-13 RX ADMIN — FENTANYL CITRATE 50 MCG: 50 INJECTION INTRAMUSCULAR; INTRAVENOUS at 12:22

## 2017-11-13 RX ADMIN — HYDROMORPHONE HYDROCHLORIDE 0.5 MG: 1 INJECTION, SOLUTION INTRAMUSCULAR; INTRAVENOUS; SUBCUTANEOUS at 13:21

## 2017-11-13 RX ADMIN — PHENYLEPHRINE HYDROCHLORIDE 100 MCG: 10 INJECTION INTRAVENOUS at 10:40

## 2017-11-13 RX ADMIN — PHENYLEPHRINE HYDROCHLORIDE 100 MCG: 10 INJECTION INTRAVENOUS at 10:33

## 2017-11-13 RX ADMIN — ONDANSETRON 4 MG: 2 INJECTION INTRAMUSCULAR; INTRAVENOUS at 11:45

## 2017-11-13 RX ADMIN — SODIUM CHLORIDE, POTASSIUM CHLORIDE, SODIUM LACTATE AND CALCIUM CHLORIDE: 600; 310; 30; 20 INJECTION, SOLUTION INTRAVENOUS at 10:23

## 2017-11-13 RX ADMIN — PHENYLEPHRINE HYDROCHLORIDE 100 MCG: 10 INJECTION INTRAVENOUS at 10:51

## 2017-11-13 ASSESSMENT — ENCOUNTER SYMPTOMS
SEIZURES: 0
DYSRHYTHMIAS: 0

## 2017-11-13 ASSESSMENT — COPD QUESTIONNAIRES: COPD: 0

## 2017-11-13 ASSESSMENT — LIFESTYLE VARIABLES: TOBACCO_USE: 1

## 2017-11-13 NOTE — IP AVS SNAPSHOT
MRN:[id number]                      After Visit Summary   11/13/2017    Kiera Macedo    MRN: [id number]           Thank you!     Thank you for choosing [facility] for your care. Our goal is always to provide you with excellent care. Hearing back from our patients is one way we can continue to improve our services. Please take a few minutes to complete the written survey that you may receive in the mail after you visit with us. Thank you!        Patient Information     Date Of Birth          1955        About your hospital stay     You were admitted on:  November 13, 2017 You last received care in the:  [facility] PACU    You were discharged on:  November 13, 2017       Who to Call     For medical emergencies, please call 911.  For non-urgent questions about your medical care, please call your primary care provider or clinic, [phone]  For questions related to your surgery, please call your surgery clinic        Attending Provider     Provider Specialty    Nunu Herrera MD Oncology       Primary Care Provider Office Phone # Fax #    Annette Gabriel [phone]      After Care Instructions     Discharge Instructions       Discharge instructions following your gynecologic procedure:  Returning to work/activities:  -Nothing per vagina for 8 weeks  -Typically patients can expect to return to light work within 2-4 weeks.  -No driving or operating machinery while taking prescription pain medication.  -You should avoid heavy lifting until your follow up visit. No lifting greater than 20 pounds for 2 weeks.     Diet:  -as tolerated    Pain:  -Motrin (or other NSAIDs) are a good additional therapy and recommend trying this in addition to other pain relievers.  -Typically there is a minimal to moderate amount of incisional pain after surgery.  - An ice pack is recommended intermittently for the first 48 hours to help reduce pain & swelling.  -Most patients are provided a  prescription for medication to take on a short term basis to help relieve the discomfort.  -If pain medication refills are needed we require you contact our office during regular business hours. (8am-5pm Monday-Friday)  -Please be aware that pain medication can cause constipation.  It may be recommended to take an over the counter stool softener as directed to prevent constipation.     Constipation:  -The pain medication you are prescribed at the time of your surgery can cause constipation.   -We recommend that you take an over the counter stool softener such as colace or senokot-s on a regular basis until you have stopped the pain medication or bowel movements are regular. You make take 1-4 tablets twice per day.   -For constipation lasting 3 days please take Milk of Magnesia or Miralax as directed on the bottles. If you have taken Milk of Magnesia and Miralax and still have not had a bowel movement please contact your our office.    Incision/Wound care:  -Leave your steri-strips in place until your post op visit.   -If you have a clear plastic dressing over a gauze on your incision, remove these 1-2 days after discharging from the hospital.   -You many shower 24hrs after surgery.  It is ok to get the steri-strips/incision wet while showering & pat the area dry with a clean towel  -No bathtubs, hot tubs or swimming is recommended for at least 2 weeks.    Vaginal drainage/spotting:  -Following a hysterectomy you may have vaginal drainage/spotting for up to 4-6 weeks from surgery. If more than a regular period please call our office.     Bladder symptoms:  -You may also have bladder irritation of difficulty starting urination from your surgery and this is normal. Please call if you have pain or burning when you urinate or fevers.     Follow up care:  -You will be asked to see Dr. Herrera's Nurse Practitioner in 1 week and Dr. Hererra in 8 weeks.   -If you don't already have an appointment, please contact the office and  our staff will happily assist you in scheduling your appointment. Bring your insurance card & government issued ID card to your appointment.    CALL YOUR SURGEON @[phone] FOR ANY OF THE FOLLOWING:  -Temperature greater than 101 degrees Fahrenheit  -Increased pain  -Increased shortness of breath  -Increased bleeding or drainage or vaginal bleeding greater than a regular menses.   -Pus-like drainage, increasing redness, swelling, tenderness, or warmth at the incision site  -Persistent nausea or vomiting                  Further instructions from your care team       Same Day Surgery Discharge Instructions for  Sedation and General Anesthesia       It's not unusual to feel dizzy, light-headed or faint for up to 24 hours after surgery or while taking pain medication.  If you have these symptoms: sit for a few minutes before standing and have someone assist you when you get up to walk or use the bathroom.      You should rest and relax for the next 24 hours. We recommend you make arrangements to have an adult stay with you for at least 24 hours after your discharge.  Avoid hazardous and strenuous activity.      DO NOT DRIVE any vehicle or operate mechanical equipment for 24 hours following the end of your surgery.  Even though you may feel normal, your reactions may be affected by the medication you have received.      Do not drink alcoholic beverages for 24 hours following surgery.       Slowly progress to your regular diet as you feel able. It's not unusual to feel nauseated and/or vomit after receiving anesthesia.  If you develop these symptoms, drink clear liquids (apple juice, ginger ale, broth, 7-up, etc. ) until you feel better.  If your nausea and vomiting persists for 24 hours, please notify your surgeon.        All narcotic pain medications, along with inactivity and anesthesia, can cause constipation. Drinking plenty of liquids and increasing fiber intake will help.      For any questions of a medical  "nature, call your surgeon.      Do not make important decisions for 24 hours.      If you had general anesthesia, you may have a sore throat for a couple of days related to the breathing tube used during surgery.  You may use Cepacol lozenges to help with this discomfort.  If it worsens or if you develop a fever, contact your surgeon.       If you feel your pain is not well managed with the pain medications prescribed by your surgeon, please contact your surgeon's office to let them know so they can address your concerns.       While you were at the hospital today you received Toradol, an antiinflammatory medication similar to Ibuprofen.  You should not take other antiinflammatory medication, such as Ibuprofen, Motrin, Advil, Aleve, Naprosyn, etc, until 6:30 pm.     Pending Results     Date and Time Order Name Status Description    11/13/2017 1131 Surgical pathology exam In process             Admission Information     Date & Time Provider Department Dept. Phone    11/13/2017 Nunu Herrera MD [facility] PACU [phone]      Your Vitals Were     Blood Pressure Temperature Respirations Height Weight Pulse Oximetry    102/70 97  F (36.1  C) (Temporal) 14 1.6 m (5' 3\") 62.5 kg (137 lb 12.8 oz) 95%    BMI (Body Mass Index)                   24.41 kg/m2           MyChart Information     [facility] gives you secure access to your electronic health record. If you see a primary care provider, you can also send messages to your care team and make appointments. If you have questions, please call your primary care clinic.  If you do not have a primary care provider, please call [phone] and they will assist you.        Care EveryWhere ID     This is your Care EveryWhere ID. This could be used by other organizations to access your [facility] medical records  [id number]        Equal Access to Services     MONA WISDOM AH: sugey Bañuelos, shy nelson " quincy magalyscamilo jennyarcenio johansen'aan ah. So [facility] [phone].    ATENCIÓN: Si habla ajith, tiene a sidhu disposición servicios gratuitos de asistencia lingüística. Marlyn al [phone].    We comply with applicable federal civil rights laws and [place] laws. We do not discriminate on the basis of race, color, national origin, age, disability, sex, sexual orientation, or gender identity.               Review of your medicines      START taking        Dose / Directions    HYDROcodone-acetaminophen 5-325 MG per tablet   Commonly known as:  NORCO   Used for:  Pelvic mass        Dose:  1-2 tablet   Take 1-2 tablets by mouth every 4 hours as needed for moderate to severe pain   Quantity:  30 tablet   Refills:  0       senna 8.6 MG tablet   Commonly known as:  SENOKOT   Used for:  Pelvic mass        Dose:  1-3 tablet   Take 1-3 tablets by mouth 2 times daily as needed for constipation   Quantity:  30 tablet   Refills:  0         CONTINUE these medicines which may have CHANGED, or have new prescriptions. If we are uncertain of the size of tablets/capsules you have at home, strength may be listed as something that might have changed.        Dose / Directions    ibuprofen 600 MG tablet   Commonly known as:  ADVIL/MOTRIN   This may have changed:    - medication strength  - how much to take  - when to take this   Used for:  Pelvic mass        Dose:  600 mg   Take 1 tablet (600 mg) by mouth every 6 hours as needed for moderate pain   Quantity:  40 tablet   Refills:  1         CONTINUE these medicines which have NOT CHANGED        Dose / Directions    AMITRIPTYLINE HCL PO        Dose:  25 mg   Take 25 mg by mouth At Bedtime   Refills:  0       CALCIUM + D PO        Take by mouth daily Dose 500-400   Refills:  0       CEPHALEXIN PO        Dose:  500 mg   Take 500 mg by mouth 4 times daily   Refills:  0       MULTIPLE VITAMIN PO        Dose:  1 tablet   Take 1 tablet by mouth daily   Refills:  0       VALTREX PO        Dose:  500 mg    Take 500 mg by mouth daily   Refills:  0            Where to get your medicines      These medications were sent to [facility] [place], [place] - [address]  [address] [place] [address]     Phone:  [phone]     ibuprofen 600 MG tablet    senna 8.6 MG tablet         Some of these will need a paper prescription and others can be bought over the counter. Ask your nurse if you have questions.     Bring a paper prescription for each of these medications     HYDROcodone-acetaminophen 5-325 MG per tablet               ANTIBIOTIC INSTRUCTION     You've Been Prescribed an Antibiotic - Now What?  Your healthcare team thinks that you or your loved one might have an infection. Some infections can be treated with antibiotics, which are powerful, life-saving drugs. Like all medications, antibiotics have side effects and should only be used when necessary. There are some important things you should know about your antibiotic treatment.      Your healthcare team may run tests before you start taking an antibiotic.    Your team may take samples (e.g., from your blood, urine or other areas) to run tests to look for bacteria. These test can be important to determine if you need an antibiotic at all and, if you do, which antibiotic will work best.      Within a few days, your healthcare team might change or even stop your antibiotic.    Your team may start you on an antibiotic while they are working to find out what is making you sick.    Your team might change your antibiotic because test results show that a different antibiotic would be better to treat your infection.    In some cases, once your team has more information, they learn that you do not need an antibiotic at all. They may find out that you don't have an infection, or that the antibiotic you're taking won't work against your infection. For example, an infection caused by a virus can't be treated with antibiotics. Staying on an  antibiotic when you don't need it is more likely to be harmful than helpful.      You may experience side effects from your antibiotic.    Like all medications, antibiotics have side effects. Some of these can be serious.    Let you healthcare team know if you have any known allergies when you are admitted to the hospital.    One significant side effect of nearly all antibiotics is the risk of severe and sometimes deadly diarrhea caused by Clostridium difficile (C. Difficile). This occurs when a person takes antibiotics because some good germs are destroyed. Antibiotic use allows C. diificile to take over, putting patients at high risk for this serious infection.    As a patient or caregiver, it is important to understand your or your loved one's antibiotic treatment. It is especially important for caregivers to speak up when patients can't speak for themselves. Here are some important questions to ask your healthcare team.    What infection is this antibiotic treating and how do you know I have that infection?    What side effects might occur from this antibiotic?    How long will I need to take this antibiotic?    Is it safe to take this antibiotic with other medications or supplements (e.g., vitamins) that I am taking?     Are there any special directions I need to know about taking this antibiotic? For example, should I take it with food?    How will I be monitored to know whether my infection is responding to the antibiotic?    What tests may help to make sure the right antibiotic is prescribed for me?      Information provided by:  www.cdc.gov/getsmart  U.S. Department of Health and Human Services  Centers for disease Control and Prevention  National Center for Emerging and Zoonotic Infectious Diseases  Division of Healthcare Quality Promotion         Protect others around you: Learn how to safely use, store and throw away your medicines at www.disposemymeds.org.             Medication List: This is a list of  all your medications and when to take them. Check marks below indicate your daily home schedule. Keep this list as a reference.      Medications           Morning Afternoon Evening Bedtime As Needed    AMITRIPTYLINE HCL PO   Take 25 mg by mouth At Bedtime                                CALCIUM + D PO   Take by mouth daily Dose 500-400                                CEPHALEXIN PO   Take 500 mg by mouth 4 times daily                                HYDROcodone-acetaminophen 5-325 MG per tablet   Commonly known as:  NORCO   Take 1-2 tablets by mouth every 4 hours as needed for moderate to severe pain   Last time this was given:  1 tablet on 11/13/2017  1:49 PM                                ibuprofen 600 MG tablet   Commonly known as:  ADVIL/MOTRIN   Take 1 tablet (600 mg) by mouth every 6 hours as needed for moderate pain                                MULTIPLE VITAMIN PO   Take 1 tablet by mouth daily                                senna 8.6 MG tablet   Commonly known as:  SENOKOT   Take 1-3 tablets by mouth 2 times daily as needed for constipation                                VALTREX PO   Take 500 mg by mouth daily

## 2017-11-13 NOTE — ANESTHESIA PREPROCEDURE EVALUATION
Procedure: Procedure(s):  COMBINED DAVINCI XI HYSTERECTOMY TOTAL, SALPINGO-OOPHORECTOMY, NODE DISSECTION  Preop diagnosis: PELVIC MASS     Allergies   Allergen Reactions     Ampicillin Diarrhea     Penicillins      Past Medical History:   Diagnosis Date     Anemia      Herpes      Other chronic pain     chronic shoulder pain d/t impingement syndrome     Ovarian mass, right      Thyroid disease     thyroid goiter     Past Surgical History:   Procedure Laterality Date     BIOPSY      bone marrow biopsy      SECTION      X3     FUSION CERVICAL ANTERIOR ONE LEVEL      X2     GYN SURGERY      repair of ruptured uterus after      ORTHOPEDIC SURGERY      foot surg X2     SINUS SURGERY       SOFT TISSUE SURGERY Right     ganglion cystexcision, foot     Social History   Substance Use Topics     Smoking status: Former Smoker     Smokeless tobacco: Never Used     Alcohol use Yes      Comment: 3-4 glasses per week     Prior to Admission medications    Medication Sig Start Date End Date Taking? Authorizing Provider   Calcium Citrate-Vitamin D (CALCIUM + D PO) Take by mouth daily Dose 500-400   Yes Reported, Patient   MULTIPLE VITAMIN PO Take 1 tablet by mouth daily   Yes Reported, Patient   IBUPROFEN PO Take 200 mg by mouth every 8 hours as needed for moderate pain   Yes Reported, Patient   AMITRIPTYLINE HCL PO Take 25 mg by mouth At Bedtime   Yes Reported, Patient   ValACYclovir HCl (VALTREX PO) Take 500 mg by mouth daily    Yes Reported, Patient     Current Facility-Administered Medications Ordered in Epic   Medication Dose Route Frequency Last Rate Last Dose     clindamycin (CLEOCIN) infusion 900 mg  900 mg Intravenous Pre-Op/Pre-procedure x 1 dose         clindamycin (CLEOCIN) infusion 900 mg  900 mg Intravenous See Admin Instructions         gentamicin (GARAMYCIN) infusion 120 mg  2 mg/kg Intravenous Pre-Op/Pre-procedure x 1 dose         gentamicin (GARAMYCIN) infusion 100 mg  1.7 mg/kg Intravenous See Admin  Instructions         No current [facility]-ordered outpatient prescriptions on file.        Wt Readings from Last 1 Encounters:   08/24/17 60.6 kg (133 lb 11.2 oz)     Temp Readings from Last 1 Encounters:   08/24/17 37  C (98.6  F)     BP Readings from Last 6 Encounters:   08/24/17 102/61   08/12/17 99/67     Pulse Readings from Last 4 Encounters:   08/24/17 95   08/12/17 70     Resp Readings from Last 1 Encounters:   08/24/17 16     SpO2 Readings from Last 1 Encounters:   08/24/17 97%     Recent Labs   Lab Test  08/09/17   0914  08/08/17 2024   NA  136  137   POTASSIUM  4.0  3.6   CHLORIDE  103  101   CO2  25  28   ANIONGAP  8  8   GLC  86  140*   BUN  13  11   CR  0.78  0.75   BC  8.2*  8.5     Recent Labs   Lab Test  08/08/17 2024   AST  21   ALT  28   ALKPHOS  63   BILITOTAL  0.2     Recent Labs   Lab Test  08/24/17   1117  08/15/17   1137   WBC  6.2  4.7   HGB  12.4  11.6*   PLT  273  165     Recent Labs   Lab Test  08/11/17   0742   INR  0.97   PTT  28      RECENT LABS:   HGB 13    ECG: NSR, no significant abnormalities    Anesthesia Evaluation     . Pt has had prior anesthetic.            ROS/MED HX    ENT/Pulmonary:     (+)tobacco use, Past use , . .   (-) asthma, COPD and sleep apnea   Neurologic:      (-) seizures, Neuropathy and migraines   Cardiovascular:        (-) hypertension, CAD, BOURGEOIS, arrhythmias, valvular problems/murmurs and dyslipidemia   METS/Exercise Tolerance:  >4 METS   Hematologic:     (+) Anemia, Other Hematologic Disorder-recent transient leukopenia, no identifiable cause     (-) history of blood clots and History of Transfusion   Musculoskeletal:   (+) , , other musculoskeletal- s/p Neck Fusion     (-) arthritis   GI/Hepatic:     (+) Other GI/Hepatic Ovarian Mass     (-) GERD and liver disease   Renal/Genitourinary:      (-) renal disease and nephrolithiasis   Endo:     (+) thyroid problem .   (-) Type I DM, Type II DM and obesity   Psychiatric:        (-) psychiatric history   Infectious  Disease:        (-) Recent Fever   Malignancy:         Other:                     Physical Exam  Normal systems: cardiovascular, pulmonary and dental    Airway   Mallampati: II  TM distance: >3 FB  Neck ROM: full    Dental   (+) caps    Cardiovascular   Rhythm and rate: regular and normal  (-) no murmur    Pulmonary    breath sounds clear to auscultation                    Anesthesia Plan      History & Physical Review      ASA Status:  2 .        Plan for General and ETT with Propofol and Intravenous induction. Maintenance will be Balanced.    PONV prophylaxis:  Ondansetron (or other 5HT-3) and Dexamethasone or Solumedrol       Postoperative Care  Postoperative pain management:  Multi-modal analgesia.      Consents

## 2017-11-13 NOTE — OR NURSING
Patient up to bathroom. Unsuccessful attempt to void. Bladder scan done for 127 ml. IV fluids opened up. Will continue to monitor.

## 2017-11-13 NOTE — DISCHARGE INSTRUCTIONS
If your bladder area becomes painful and distended and you are unable to urinate then report to hospital Emergency Room    Same Day Surgery Discharge Instructions for  Sedation and General Anesthesia       It's not unusual to feel dizzy, light-headed or faint for up to 24 hours after surgery or while taking pain medication.  If you have these symptoms: sit for a few minutes before standing and have someone assist you when you get up to walk or use the bathroom.      You should rest and relax for the next 24 hours. We recommend you make arrangements to have an adult stay with you for at least 24 hours after your discharge.  Avoid hazardous and strenuous activity.      DO NOT DRIVE any vehicle or operate mechanical equipment for 24 hours following the end of your surgery.  Even though you may feel normal, your reactions may be affected by the medication you have received.      Do not drink alcoholic beverages for 24 hours following surgery.       Slowly progress to your regular diet as you feel able. It's not unusual to feel nauseated and/or vomit after receiving anesthesia.  If you develop these symptoms, drink clear liquids (apple juice, ginger ale, broth, 7-up, etc. ) until you feel better.  If your nausea and vomiting persists for 24 hours, please notify your surgeon.        All narcotic pain medications, along with inactivity and anesthesia, can cause constipation. Drinking plenty of liquids and increasing fiber intake will help.      For any questions of a medical nature, call your surgeon.      Do not make important decisions for 24 hours.      If you had general anesthesia, you may have a sore throat for a couple of days related to the breathing tube used during surgery.  You may use Cepacol lozenges to help with this discomfort.  If it worsens or if you develop a fever, contact your surgeon.       If you feel your pain is not well managed with the pain medications prescribed by your surgeon, please contact  your surgeon's office to let them know so they can address your concerns.       While you were at the hospital today you received Toradol, an antiinflammatory medication similar to Ibuprofen.  You should not take other antiinflammatory medication, such as Ibuprofen, Motrin, Advil, Aleve, Naprosyn, etc, until 6:30 pm.

## 2017-11-13 NOTE — OR NURSING
Dr Herrera called regarding unable to void and bladder scan shows 127 cc- OK to discharge to home- instruct pt and family that if her bladder area becomes painful and distended and she is unable to urinate then she should report to hospital Emergency Room

## 2017-11-13 NOTE — PROCEDURES
POST OPERATIVE NOTE-IMMEDIATE :  Preoperative Diagnosis:  PELVIC MASS     Postoperative Diagnosis:  Same     NATIONAL GUIDELINE REFERENCED FOR TREATMENT PLANNING:NCCN    Procedures:  Robotic assisted laparoscopic total hysterectomy  Bilateral salpingo oophorectomy    Prosthetic Devices:  None    Surgeon(s) and Assistants (if any):  Surgeon(s):  Nunu Herrera MD  Circulator: Maritza Shoemaker RN  Nurse Practitioner: Radha Anderson APRN CNP  Relief Circulator: Erica Garcia RN; Nadine Bryan RN  Relief Scrub: Isabella Sagastume  Scrub Person: Laura Ta    Anesthesia:  General    Drains:  none    Specimens:  Uterus, cervix, bilateral fallopian tubes and ovaries    Complications: none    Findings/Conclusions: Benign endometrium, benign right ovarian fibroma on frozen.     Estimated Blood Loss:  2cc    Condition on discharge from OR:  Satisfactory      Radha Anderson   On Behalf of  Surgeon(s):  Nunu Herrera MD

## 2017-11-13 NOTE — ANESTHESIA POSTPROCEDURE EVALUATION
Patient: Kiera Macedo    Procedure(s):  ROBOTIC ASSISTED  LAPAROSCOPIC TOTAL HYSTERECTOMY, BILATERAL SALPINGO-OOPHORECTOMY - Wound Class: II-Clean Contaminated    Diagnosis:PELVIC MASS   Diagnosis Additional Information: No value filed.    Anesthesia Type:  General, ETT    Note:  Anesthesia Post Evaluation    Patient location during evaluation: Phase 2  Patient participation: Able to fully participate in evaluation  Level of consciousness: awake and alert  Pain management: adequate  Airway patency: patent  Cardiovascular status: acceptable, hemodynamically stable and blood pressure returned to baseline  Respiratory status: acceptable and room air  Hydration status: acceptable  PONV: none     Anesthetic complications: None          Last vitals:  Vitals:    11/13/17 1330 11/13/17 1345 11/13/17 1400   BP: 97/63 102/70    Resp: 11 14 14   Temp:   36.4  C (97.6  F)   SpO2: 91% 95% 94%         Electronically Signed By: Deyanira Elena MD  November 13, 2017  2:54 PM

## 2017-11-13 NOTE — IP AVS SNAPSHOT
[facility] [address]    [place] [address]    Phone:  [phone]                                       After Visit Summary   11/13/2017    Kiera Macedo    MRN: [id number]           After Visit Summary Signature Page     I have received my discharge instructions, and my questions have been answered. I have discussed any challenges I see with this plan with the nurse or doctor.    ..........................................................................................................................................  Patient/Patient Representative Signature      ..........................................................................................................................................  Patient Representative Print Name and Relationship to Patient    ..................................................               ................................................  Date                                            Time    ..........................................................................................................................................  Reviewed by Signature/Title    ...................................................              ..............................................  Date                                                            Time

## 2017-11-13 NOTE — OP NOTE
DATE OF PROCEDURE:  2017       PREOPERATIVE DIAGNOSIS:  Right pelvic mass.      POSTOPERATIVE DIAGNOSIS:  Right ovarian fibroma.      PROCEDURE: Robotic-assisted total laparoscopic hysterectomy, bilateral salpingo-oophorectomy.      SURGEON:  ANNALISE Herrera MD      ASSISTANT:  SHASTA Muro CNP       ANESTHESIA:  General endotracheal anesthesia.        INDICATIONS FOR THE PROCEDURE:  Kiera Macedo is a 62-year-old female who in May and 2017 had intermittent fevers to 101.6.  She was admitted to hospital for evaluation of fever and low white count.  Pelvic ultrasound was obtained and revealed a possible right adnexal endometrioma.  She was subsequently evaluated by primary care and had some fullness in the right adnexal area.  An MRI of the pelvis revealed a 4.5 x 5 cm solid mass, and they could not tell whether this was a solid ovarian mass or broad ligament fibroid.  CA-125 was normal.  She was eventually referred to my office.  I did find a mobile heavy and smooth right adnexal mass, which I thought likely represented a fibroma.  She was brought to the operating room for definitive hysterectomy.      FINDINGS:  The patient had no anterior abdominal wall adhesions despite her prior multiple laparotomies and  sections.  She did have adherence of the bladder to the lower uterine segment due to her multiple  sections.  Both gross and frozen sections of the right ovary were consistent with a benign fibroma.      PROCEDURE IN DETAIL:  The patient was taken to the operating room.  She received broad-spectrum antibiotic prophylaxis.  Knee-high sequential compression devices were placed on her lower extremities.  She is brought to the operating room, placed in the supine position on a pink pad on the operating room table.  General endotracheal anesthesia was administered in the usual fashion.  Once intubated, she was repositioned in low lithotomy position using Yellofin stirrups.  Her  arms were padded and held at her side with draw sheets.  Shoulder braces were applied to her shoulders, and a Santiago was placed above her forehead.  A donut was placed above her face.  She was prepped and draped in the usual sterile fashion.  A timeout was conducted, and everyone agreed upon the procedure.  I started by infiltrating the supraumbilical area approximately 21 cm above the symphysis pubis in the midline with 0.5% Marcaine with epinephrine.  A small nick was made in the skin with a #15 scalpel blade.  A Veress needle was introduced into the peritoneal cavity.  Opening pressure was 3 mmHg.  The abdomen was insufflated carbon dioxide to create a diffuse pneumoperitoneum pressure limits were set and maintained at or below 15 mmHg throughout the case.  With establishment of pneumoperitoneum the Veress needle was removed and replaced with an 8.5 mm da Isaak camera port.  The camera was then introduced, confirming intraperitoneal position and showing no anterior abdominal wall adhesions.  Four additional ports were placed under direct visualization, an 8 mm da Isaak port was placed 8 cm to the right of the camera port in the upper abdomen, another 8 mm da Isaak port was placed 7-8 cm to the left of the camera port in the upper abdomen.  She was then placed in steep Trendelenburg with gravitational displacement of her bowel into the upper abdomen.  A third 8 mm da Isaak port was placed above the left anterior superior iliac spine, and an 8 mm AirSeal port was placed above the right anterior superior iliac spine.  The robot was docked in the usual fashion.  A Melchor catheter was placed into her bladder and a large EEA sizer into her vagina.  The robot was docked. Monopolar scissors were placed in the right upper robotic arm, a PK in the left upper robotic arm, and a ProGrasp in the left lower robotic arm.        The instruments were advanced into the pelvis.  The right round ligament was grasped with a ProGrasp  and elevated.  It was cauterized with the PK and divided with monopolar scissors.  We then opened up the posterior broad ligament peritoneum, opened up the peritoneum below the ovarian vessels and above the ureter on the medial aspect of the posterior broad ligament peritoneum and extended this towards uterus.  The ovarian vessels were cauterized with the PK at the pelvic brim and divided with the monopolar scissors.  We then repeated this on the left-hand side.  The vesicouterine peritoneum was divided, and with the great care, the bladder was carefully taken down off the anterior surface of the cervix and upper vagina.  There was definitely some scarring in the midline due to her prior multiple  sections.  Once this was taken down, the soft tissues at the isthmus bilaterally were cauterized with the PK and divided with the monopolar scissors.  We secured both uterine arteries in a similar fashion, cauterizing and dividing it.  We then made our way down the cardinal ligament, including dividing the soft tissues from the cervix, including the uterosacral ligament in the main trunk of the uterine vein.  This was done on both sides.  The EEA sizer was pushed into the anterior fornix.  The bladder was further taken down off the upper vagina.  At the cervicovaginal junction, we made a colpotomy and circumferentially divided the cervix free of the vagina.  Once this was done, a single-tooth tenaculum was brought through the colpotomy, and it was utilized to grasp the cervix.  Cervix, uterus, tubes and ovaries were removed and sent to pathology.  We closed the vaginal cuff with 0 PDS suture from the lateral angles and did a running full-thickness anterior to posterior closure incorporating the uterosacral ligaments and the posterior cul-de-sac in that closure as well.  The two sutures from either side were tied together in the midline.  The pelvis was irrigated.  At this point, Pathology did confirm that this was  a benign fibroma and there was no abnormality of her endometrium.        The robotic instruments were removed.  The robot was undocked.  The pneumoperitoneum was allowed to dissipate.  I closed the fascial incision in the right lower quadrant with 0 Vicryl suture on a CT2 needle.  All of the incisions were closed in a 2-layer closure using 4-0 Monocryl in an interrupted suture to reapproximate the subcutaneous tissue and 4-0 Monocryl in a subcuticular fashion to reapproximate the skin.  The patient's anesthesia was reversed.  She was extubated and taken to recovery room in stable condition.        ESTIMATED BLOOD LOSS:  25 mL       Radha Anderson was my primary assist throughout the case.  She helped with all aspects of the case, including initial trocar placement, docking of the robot, initial placement of the robotic instruments.  She assisted through the accessory port site and was instrumental in specimen retrieval.  She helped me with cuff closure, undocking of the robot and port site closure.         CHATO VALADEZ MD             D: 2017 12:08   T: 2017 12:37   MT: [id number]      Name:     AJ JONES   MRN:      [id number]        Account:        [id number]   :      1955           Procedure Date: 2017      Document: [id number]       cc: Annette Ascencio MD

## 2017-11-13 NOTE — ANESTHESIA CARE TRANSFER NOTE
Patient: Kiera Macedo    Procedure(s):  ROBOTIC ASSISTED  LAPAROSCOPIC TOTAL HYSTERECTOMY, BILATERAL SALPINGO-OOPHORECTOMY - Wound Class: II-Clean Contaminated    Diagnosis: PELVIC MASS   Diagnosis Additional Information: No value filed.    Anesthesia Type:   General, ETT     Note:  Airway :Face Mask  Patient transferred to:PACU  Comments: O2 8 lpm mask. Spontaneous respirations. sats 99%. VSS. Report to RN.Handoff Report: Identifed the Patient, Identified the Reponsible Provider, Reviewed the pertinent medical history, Discussed the surgical course, Reviewed Intra-OP anesthesia mangement and issues during anesthesia, Set expectations for post-procedure period and Allowed opportunity for questions and acknowledgement of understanding      Vitals: (Last set prior to Anesthesia Care Transfer)    CRNA VITALS  11/13/2017 1140 - 11/13/2017 1217      11/13/2017             Pulse: 73    SpO2: 98 %    Resp Rate (set): 10                Electronically Signed By: SHASTA Klein CRNA  November 13, 2017  12:17 PM

## 2017-11-14 LAB — COPATH REPORT: NORMAL

## 2018-05-07 ENCOUNTER — TELEPHONE (OUTPATIENT)
Dept: OTHER | Facility: CLINIC | Age: 63
End: 2018-05-07

## 2018-05-07 NOTE — TELEPHONE ENCOUNTER
5/7/2018    Call Regarding Onboarding [employer] CHOICES     Attempt 1    Message on voicemail     Comments:           Outreach   AT

## 2018-05-25 ENCOUNTER — OFFICE VISIT (OUTPATIENT)
Dept: URGENT CARE | Facility: URGENT CARE | Age: 63
End: 2018-05-25
Payer: COMMERCIAL

## 2018-05-25 VITALS
DIASTOLIC BLOOD PRESSURE: 60 MMHG | HEART RATE: 77 BPM | TEMPERATURE: 98 F | OXYGEN SATURATION: 97 % | SYSTOLIC BLOOD PRESSURE: 112 MMHG

## 2018-05-25 DIAGNOSIS — R21 RASH AND NONSPECIFIC SKIN ERUPTION: Primary | ICD-10-CM

## 2018-05-25 PROCEDURE — 99203 OFFICE O/P NEW LOW 30 MIN: CPT | Performed by: PHYSICIAN ASSISTANT

## 2018-05-25 RX ORDER — METHYLPREDNISOLONE 4 MG
TABLET, DOSE PACK ORAL
Qty: 21 TABLET | Refills: 0 | Status: SHIPPED | OUTPATIENT
Start: 2018-05-25

## 2018-05-25 NOTE — PROGRESS NOTES
SUBJECTIVE:  Kiera Macedo is a 62 year old female who presents to the clinic today for a rash.  Onset of rash was 2 day(s) ago.   Rash is sudden onset.  Location of the rash: face B/L and behind right ear.  Quality/symptoms of rash: itching and red  Symptoms are moderate and rash seems to be worsening.  Previous history of a similar rash? No  Recent exposure history: None known, has been out in the garden today    Associated symptoms include: nothing. DENIES fever, throat tightness, wheezing, cough, nausea, vomiting, rhinorrhea, sore throat and URI symptoms.    Past Medical History:   Diagnosis Date     Anemia      Herpes      History of blood transfusion      Other chronic pain     chronic shoulder pain d/t impingement syndrome     Ovarian mass, right      Thyroid disease     thyroid goiter     Current Outpatient Prescriptions   Medication Sig Dispense Refill     AMITRIPTYLINE HCL PO Take 25 mg by mouth At Bedtime       Calcium Citrate-Vitamin D (CALCIUM + D PO) Take by mouth daily Dose 500-400       CEPHALEXIN PO Take 500 mg by mouth 4 times daily       HYDROcodone-acetaminophen (NORCO) 5-325 MG per tablet Take 1-2 tablets by mouth every 4 hours as needed for moderate to severe pain 30 tablet 0     ibuprofen (ADVIL/MOTRIN) 600 MG tablet Take 1 tablet (600 mg) by mouth every 6 hours as needed for moderate pain 40 tablet 1     methylPREDNISolone (MEDROL DOSEPAK) 4 MG tablet Follow package instructions 21 tablet 0     MULTIPLE VITAMIN PO Take 1 tablet by mouth daily       senna (SENOKOT) 8.6 MG tablet Take 1-3 tablets by mouth 2 times daily as needed for constipation 30 tablet 0     ValACYclovir HCl (VALTREX PO) Take 500 mg by mouth daily        Social History   Substance Use Topics     Smoking status: Never Smoker     Smokeless tobacco: Never Used     Alcohol use Yes      Comment: 1-2 glasses of wine a day     ROS:  C: NEGATIVE for fever, chills, change in weight  INTEGUMENTARY/SKIN: POSITIVE for rash  E/M:  NEGATIVE for sore throat, ear or sinus problems  R: NEGATIVE for cough  CV: NEGATIVE for chest pain, palpitations or peripheral edema  GI: NEGATIVE for nausea, abdominal pain, heartburn, or change in bowel habits  : NEGATIVE for dysuria, hematuria, decreased urinary stream or discharge  MUSCULOSKELETAL: NEGATIVE for significant arthralgias or myalgia  NEURO: NEGATIVE for weakness, dizziness or paresthesias  ENDOCRINE: NEGATIVE for cold intolerance, HX diabetes and HX thyroid disease  HEME/ALLERGY/IMMUNE: NEGATIVE for swollen nodes      EXAM:   /60 (BP Location: Right arm, Patient Position: Chair, Cuff Size: Adult Regular)  Pulse 77  Temp 98  F (36.7  C) (Tympanic)  SpO2 97%  GENERAL: alert, no acute distress.  SKIN: Rash description:    Distribution: localized  Location: face    Color: red,  Lesion type: maculopapular, blotchy with no other findings  GENERAL APPEARANCE: healthy, alert and no distress  EYES: EOMI,  PERRL, conjunctiva clear  NECK: supple, non-tender to palpation, no adenopathy noted  RESP: lungs clear to auscultation - no rales, rhonchi or wheezes  CV: regular rates and rhythm, normal S1 S2, no murmur noted    ASSESSMENT / PLAN:  1. Rash and nonspecific skin eruption  Benadryl 25mg Q 4-6 hours  Follow up with PCP for worsening or non resolving symptoms  - methylPREDNISolone (MEDROL DOSEPAK) 4 MG tablet; Follow package instructions  Dispense: 21 tablet; Refill: 0    Violet Altman PA-C

## 2018-05-25 NOTE — PATIENT INSTRUCTIONS
Atopic Dermatitis (Adult)  Atopic dermatitis is a dry, itchy, red rash. It s also called eczema. The rash is chronic, or ongoing. It can come and go over time. The disease is often passed down in families. It causes a problem with the skin barrier that makes the skin more sensitive to the environment and other factors. The increased skin sensitivity causes an itch, which causes scratching. Scratching can worsen the itching or also break the skin. This can put the skin at risk of infection.  The condition is most common in people with asthma, hay fever, hives, or dry or sensitive skin. The rash may be caused by extreme heat or heavy sweating. Skin irritants can cause the rash to flare up. These can include wool or silk clothing, grease, oils, some medicines, and harsh soaps and detergents. Emotional stress can also be a trigger.  Treatment is done to relieve the itching and inflammation of the skin.  Home care  Follow these tips to care for your condition:    Keep the areas of rash clean by bathing at least every other day. Use lukewarm water to bathe. Don t use hot water, which can dry out the skin.    Don t use soaps with strong detergents. Use mild soaps made for sensitive skin.    Apply a cream or ointment to damp skin right after bathing.    Avoid things that irritate your skin. Wear absorbent, soft fabrics next to the skin rather than rough or scratchy materials.    Use mild laundry soap free of scents and perfumes. Make sure to rinse all the soap out of your clothes.    Treat any skin infection as directed.    Use oral diphenhydramine to help reduce itching. This is an antihistamine you can buy at drug and grocery stores. It can make you sleepy, so use lower doses during the daytime. Or you can use loratadine. This is an antihistamine that will not make you sleepy. Do not use diphenhydramine if you have glaucoma or have trouble urinating due to an enlarged prostate.  Follow-up care  See your healthcare  provider, or as advised. If your symptoms don t get better or if they get worse in the next 7 days, make an appointment with your healthcare provider.  When to seek medical advice  Call your healthcare provider right away  if any of these occur:    Increasing area of redness or pain in the skin    Yellow crusts or wet drainage from the rash    Fever of 100.4 F (38 C) or higher, or as directed by your healthcare provider  Date Last Reviewed: 9/1/2016 2000-2017 [employer]. [address] [place] [address]. All rights reserved. This information is not intended as a substitute for professional medical care. Always follow your healthcare professional's instructions.

## 2018-05-25 NOTE — MR AVS SNAPSHOT
After Visit Summary   5/25/2018    Kiera Macedo    MRN: [id number]           Patient Information     Date Of Birth          1955        Visit Information        Provider Department      5/25/2018 1:35 PM Violet Altman PA-C [facility]        Today's Diagnoses     Rash and nonspecific skin eruption    -  1      Care Instructions      Atopic Dermatitis (Adult)  Atopic dermatitis is a dry, itchy, red rash. It s also called eczema. The rash is chronic, or ongoing. It can come and go over time. The disease is often passed down in families. It causes a problem with the skin barrier that makes the skin more sensitive to the environment and other factors. The increased skin sensitivity causes an itch, which causes scratching. Scratching can worsen the itching or also break the skin. This can put the skin at risk of infection.  The condition is most common in people with asthma, hay fever, hives, or dry or sensitive skin. The rash may be caused by extreme heat or heavy sweating. Skin irritants can cause the rash to flare up. These can include wool or silk clothing, grease, oils, some medicines, and harsh soaps and detergents. Emotional stress can also be a trigger.  Treatment is done to relieve the itching and inflammation of the skin.  Home care  Follow these tips to care for your condition:    Keep the areas of rash clean by bathing at least every other day. Use lukewarm water to bathe. Don t use hot water, which can dry out the skin.    Don t use soaps with strong detergents. Use mild soaps made for sensitive skin.    Apply a cream or ointment to damp skin right after bathing.    Avoid things that irritate your skin. Wear absorbent, soft fabrics next to the skin rather than rough or scratchy materials.    Use mild laundry soap free of scents and perfumes. Make sure to rinse all the soap out of your clothes.    Treat any skin infection as directed.    Use oral diphenhydramine  to help reduce itching. This is an antihistamine you can buy at drug and grocery stores. It can make you sleepy, so use lower doses during the daytime. Or you can use loratadine. This is an antihistamine that will not make you sleepy. Do not use diphenhydramine if you have glaucoma or have trouble urinating due to an enlarged prostate.  Follow-up care  See your healthcare provider, or as advised. If your symptoms don t get better or if they get worse in the next 7 days, make an appointment with your healthcare provider.  When to seek medical advice  Call your healthcare provider right away  if any of these occur:    Increasing area of redness or pain in the skin    Yellow crusts or wet drainage from the rash    Fever of 100.4 F (38 C) or higher, or as directed by your healthcare provider  Date Last Reviewed: 9/1/2016 2000-2017 [employer]. [address] [place] [address]. All rights reserved. This information is not intended as a substitute for professional medical care. Always follow your healthcare professional's instructions.                Follow-ups after your visit        Who to contact     If you have questions or need follow up information about today's clinic visit or your schedule please contact [facility] directly at [phone].  Normal or non-critical lab and imaging results will be communicated to you by [employer], letter or phone within 4 business days after the clinic has received the results. If you do not hear from us within 7 days, please contact the clinic through [employer] or phone. If you have a critical or abnormal lab result, we will notify you by phone as soon as possible.  Submit refill requests through [employer] or call your pharmacy and they will forward the refill request to us. Please allow 3 business days for your refill to be completed.          Additional Information About Your Visit        [employer] Information     [employer] gives you secure access  to your electronic health record. If you see a primary care provider, you can also send messages to your care team and make appointments. If you have questions, please call your primary care clinic.  If you do not have a primary care provider, please call [phone] and they will assist you.        Care EveryWhere ID     This is your Care EveryWhere ID. This could be used by other organizations to access your [place] medical records  [id number]        Your Vitals Were     Pulse Temperature Pulse Oximetry             77 98  F (36.7  C) (Tympanic) 97%          Blood Pressure from Last 3 Encounters:   05/25/18 112/60   11/13/17 111/67   08/24/17 102/61    Weight from Last 3 Encounters:   11/13/17 137 lb 12.8 oz (62.5 kg)   08/24/17 133 lb 11.2 oz (60.6 kg)   08/10/17 136 lb 9.6 oz (62 kg)              Today, you had the following     No orders found for display         Today's Medication Changes          These changes are accurate as of 5/25/18  2:01 PM.  If you have any questions, ask your nurse or doctor.               Start taking these medicines.        Dose/Directions    methylPREDNISolone 4 MG tablet   Commonly known as:  MEDROL DOSEPAK   Used for:  Rash and nonspecific skin eruption        Follow package instructions   Quantity:  21 tablet   Refills:  0            Where to get your medicines      These medications were sent to [facility] [id number]  [address] [facility] [address]     Phone:  [phone]     methylPREDNISolone 4 MG tablet                Primary Care Provider Office Phone # Fax #    Annette Gabriel [phone]       [address] [address]        Equal Access to Services     MONA WISDOM AH: Hadrenay Newsome, wani null, qaybta kaalshy dixon. So [facility] [phone].    ATENCIÓN: Si habla español, tiene a sidhu disposición servicios gratuitos de  asistencia lingüística. Marlyn al [phone].    We comply with applicable federal civil rights laws and [place] laws. We do not discriminate on the basis of race, color, national origin, age, disability, sex, sexual orientation, or gender identity.            Thank you!     Thank you for choosing [facility]  for your care. Our goal is always to provide you with excellent care. Hearing back from our patients is one way we can continue to improve our services. Please take a few minutes to complete the written survey that you may receive in the mail after your visit with us. Thank you!             Your Updated Medication List - Protect others around you: Learn how to safely use, store and throw away your medicines at www.disposemymeds.org.          This list is accurate as of 5/25/18  2:01 PM.  Always use your most recent med list.                   Brand Name Dispense Instructions for use Diagnosis    AMITRIPTYLINE HCL PO      Take 25 mg by mouth At Bedtime        CALCIUM + D PO      Take by mouth daily Dose 500-400        CEPHALEXIN PO      Take 500 mg by mouth 4 times daily        HYDROcodone-acetaminophen 5-325 MG per tablet    NORCO    30 tablet    Take 1-2 tablets by mouth every 4 hours as needed for moderate to severe pain    Pelvic mass       ibuprofen 600 MG tablet    ADVIL/MOTRIN    40 tablet    Take 1 tablet (600 mg) by mouth every 6 hours as needed for moderate pain    Pelvic mass       methylPREDNISolone 4 MG tablet    MEDROL DOSEPAK    21 tablet    Follow package instructions    Rash and nonspecific skin eruption       MULTIPLE VITAMIN PO      Take 1 tablet by mouth daily        senna 8.6 MG tablet    SENOKOT    30 tablet    Take 1-3 tablets by mouth 2 times daily as needed for constipation    Pelvic mass       VALTREX PO      Take 500 mg by mouth daily

## 2019-10-03 ENCOUNTER — HEALTH MAINTENANCE LETTER (OUTPATIENT)
Age: 64
End: 2019-10-03

## 2020-02-08 ENCOUNTER — HEALTH MAINTENANCE LETTER (OUTPATIENT)
Age: 65
End: 2020-02-08

## 2021-01-15 ENCOUNTER — HEALTH MAINTENANCE LETTER (OUTPATIENT)
Age: 66
End: 2021-01-15

## 2021-03-21 ENCOUNTER — HEALTH MAINTENANCE LETTER (OUTPATIENT)
Age: 66
End: 2021-03-21

## 2021-09-05 ENCOUNTER — HEALTH MAINTENANCE LETTER (OUTPATIENT)
Age: 66
End: 2021-09-05

## 2021-10-31 ENCOUNTER — HEALTH MAINTENANCE LETTER (OUTPATIENT)
Age: 66
End: 2021-10-31

## 2022-04-17 ENCOUNTER — HEALTH MAINTENANCE LETTER (OUTPATIENT)
Age: 67
End: 2022-04-17

## 2022-09-07 ENCOUNTER — TRANSFERRED RECORDS (OUTPATIENT)
Dept: HEALTH INFORMATION MANAGEMENT | Facility: CLINIC | Age: 67
End: 2022-09-07

## 2022-09-07 ENCOUNTER — MEDICAL CORRESPONDENCE (OUTPATIENT)
Dept: HEALTH INFORMATION MANAGEMENT | Facility: CLINIC | Age: 67
End: 2022-09-07

## 2022-09-08 DIAGNOSIS — R07.89 ATYPICAL CHEST PAIN: Primary | ICD-10-CM

## 2022-09-08 DIAGNOSIS — Z82.49 FAMILY HISTORY OF CORONARY ARTERY DISEASE: ICD-10-CM

## 2022-09-27 ENCOUNTER — ANCILLARY PROCEDURE (OUTPATIENT)
Dept: MAMMOGRAPHY | Facility: CLINIC | Age: 67
End: 2022-09-27
Attending: FAMILY MEDICINE
Payer: COMMERCIAL

## 2022-09-27 DIAGNOSIS — Z12.31 VISIT FOR SCREENING MAMMOGRAM: ICD-10-CM

## 2022-09-27 PROCEDURE — 77063 BREAST TOMOSYNTHESIS BI: CPT | Mod: TC | Performed by: RADIOLOGY

## 2022-09-27 PROCEDURE — 77067 SCR MAMMO BI INCL CAD: CPT | Mod: TC | Performed by: RADIOLOGY

## 2023-06-01 ENCOUNTER — HEALTH MAINTENANCE LETTER (OUTPATIENT)
Age: 68
End: 2023-06-01

## 2024-08-11 ENCOUNTER — HEALTH MAINTENANCE LETTER (OUTPATIENT)
Age: 69
End: 2024-08-11

## 2024-12-22 ENCOUNTER — HEALTH MAINTENANCE LETTER (OUTPATIENT)
Age: 69
End: 2024-12-22

## 2025-08-17 ENCOUNTER — HEALTH MAINTENANCE LETTER (OUTPATIENT)
Age: 70
End: 2025-08-17

## (undated) DEVICE — DRSG STERI STRIP 1X5" R1548

## (undated) DEVICE — DAVINCI DRAPE KIT 4-ARM 420291

## (undated) DEVICE — SU MONOCRYL 4-0 PS-2 18" UND Y496G

## (undated) DEVICE — SU VICRYL 0 CT-2 27" J334H

## (undated) DEVICE — GLOVE PROTEXIS MICRO 6.5  2D73PM65

## (undated) DEVICE — TROCAR AIRSEAL BLADELESS 12X120MM IAS12-120

## (undated) DEVICE — SU VICRYL 0 CT-1 27" J340H

## (undated) DEVICE — SOL NACL 0.9% INJ 1000ML BAG 2B1324X

## (undated) DEVICE — SU PDS II 0 CT-2 27" Z334H

## (undated) DEVICE — DAVINCI S CANNULA SEAL 8.5-13MM 420206

## (undated) DEVICE — SUCTION IRR TRUMPET VALVE LAP ASU1201

## (undated) DEVICE — PACK DAVINCI GYN SMA15GDFS1

## (undated) DEVICE — SPONGE LAP 18X18" X8435

## (undated) DEVICE — SPONGE RAY-TEC 4X4" 7317

## (undated) DEVICE — DAVINCI OBTURATOR 8MM BLADELESS 420023

## (undated) DEVICE — SUCTION CANISTER MEDIVAC LINER 3000ML W/LID 65651-530

## (undated) DEVICE — DAVINCI S FCP TENACULUM 420207

## (undated) DEVICE — SOL NACL 0.9% IRRIG 1000ML BOTTLE 07138-09

## (undated) DEVICE — ESU CORD MONOPOLAR 10'  E0510

## (undated) DEVICE — LINEN TOWEL PACK X5 5464

## (undated) DEVICE — GLOVE PROTEXIS BLUE W/NEU-THERA 6.5  2D73EB65

## (undated) DEVICE — SYR 50ML LL W/O NDL 309653

## (undated) DEVICE — DAVINCI HOT SHEARS TIP COVER  400180

## (undated) DEVICE — TUBING CONMED AIRSEAL SMOKE EVAC INSUFFLATION ASM-EVAC

## (undated) DEVICE — CATH TRAY FOLEY SURESTEP 16FR WDRAIN BAG STLK LATEX A300316A

## (undated) DEVICE — CLIP ENDO HEMO-LOC PURPLE LG 544240

## (undated) DEVICE — KIT PATIENT POSITIONING PIGAZZI LATEX FREE 40580

## (undated) DEVICE — ADH LIQUID MASTISOL TOPICAL VIAL 2-3ML 0523-48

## (undated) DEVICE — ESU GROUND PAD UNIVERSAL W/O CORD

## (undated) DEVICE — DRAPE CV SPLIT II 147X106" 9158

## (undated) RX ORDER — ONDANSETRON 2 MG/ML
INJECTION INTRAMUSCULAR; INTRAVENOUS
Status: DISPENSED
Start: 2017-11-13

## (undated) RX ORDER — HYDROCODONE BITARTRATE AND ACETAMINOPHEN 5; 325 MG/1; MG/1
TABLET ORAL
Status: DISPENSED
Start: 2017-11-13

## (undated) RX ORDER — HYDROMORPHONE HYDROCHLORIDE 1 MG/ML
INJECTION, SOLUTION INTRAMUSCULAR; INTRAVENOUS; SUBCUTANEOUS
Status: DISPENSED
Start: 2017-11-13

## (undated) RX ORDER — CLINDAMYCIN PHOSPHATE 900 MG/50ML
INJECTION, SOLUTION INTRAVENOUS
Status: DISPENSED
Start: 2017-11-13

## (undated) RX ORDER — FENTANYL CITRATE 50 UG/ML
INJECTION, SOLUTION INTRAMUSCULAR; INTRAVENOUS
Status: DISPENSED
Start: 2017-11-13

## (undated) RX ORDER — GLYCOPYRROLATE 0.2 MG/ML
INJECTION, SOLUTION INTRAMUSCULAR; INTRAVENOUS
Status: DISPENSED
Start: 2017-11-13

## (undated) RX ORDER — KETOROLAC TROMETHAMINE 30 MG/ML
INJECTION, SOLUTION INTRAMUSCULAR; INTRAVENOUS
Status: DISPENSED
Start: 2017-11-13

## (undated) RX ORDER — DEXAMETHASONE SODIUM PHOSPHATE 4 MG/ML
INJECTION, SOLUTION INTRA-ARTICULAR; INTRALESIONAL; INTRAMUSCULAR; INTRAVENOUS; SOFT TISSUE
Status: DISPENSED
Start: 2017-11-13

## (undated) RX ORDER — PROPOFOL 10 MG/ML
INJECTION, EMULSION INTRAVENOUS
Status: DISPENSED
Start: 2017-11-13

## (undated) RX ORDER — LIDOCAINE HYDROCHLORIDE 20 MG/ML
INJECTION, SOLUTION EPIDURAL; INFILTRATION; INTRACAUDAL; PERINEURAL
Status: DISPENSED
Start: 2017-11-13